# Patient Record
Sex: FEMALE | Race: WHITE | NOT HISPANIC OR LATINO | Employment: OTHER | URBAN - METROPOLITAN AREA
[De-identification: names, ages, dates, MRNs, and addresses within clinical notes are randomized per-mention and may not be internally consistent; named-entity substitution may affect disease eponyms.]

---

## 2017-01-19 ENCOUNTER — COMMUNICATION - HEALTHEAST (OUTPATIENT)
Dept: OBGYN | Facility: CLINIC | Age: 32
End: 2017-01-19

## 2017-09-10 ENCOUNTER — HEALTH MAINTENANCE LETTER (OUTPATIENT)
Age: 32
End: 2017-09-10

## 2018-09-07 ENCOUNTER — RECORDS - HEALTHEAST (OUTPATIENT)
Dept: ADMINISTRATIVE | Facility: OTHER | Age: 33
End: 2018-09-07

## 2018-11-08 ENCOUNTER — COMMUNICATION - HEALTHEAST (OUTPATIENT)
Dept: MIDWIFE SERVICES | Facility: CLINIC | Age: 33
End: 2018-11-08

## 2018-11-13 ENCOUNTER — RECORDS - HEALTHEAST (OUTPATIENT)
Dept: ADMINISTRATIVE | Facility: OTHER | Age: 33
End: 2018-11-13

## 2021-06-02 VITALS — WEIGHT: 192 LBS | BODY MASS INDEX: 31.95 KG/M2 | HEIGHT: 65 IN | BODY MASS INDEX: 31.95 KG/M2

## 2021-07-31 ENCOUNTER — HEALTH MAINTENANCE LETTER (OUTPATIENT)
Age: 36
End: 2021-07-31

## 2021-09-25 ENCOUNTER — HEALTH MAINTENANCE LETTER (OUTPATIENT)
Age: 36
End: 2021-09-25

## 2022-08-27 ENCOUNTER — HEALTH MAINTENANCE LETTER (OUTPATIENT)
Age: 37
End: 2022-08-27

## 2023-02-19 LAB — GROUP B STREPTOCOCCUS (EXTERNAL): NEGATIVE

## 2023-04-13 ENCOUNTER — TRANSFERRED RECORDS (OUTPATIENT)
Dept: HEALTH INFORMATION MANAGEMENT | Facility: CLINIC | Age: 38
End: 2023-04-13
Payer: COMMERCIAL

## 2023-04-22 ENCOUNTER — HEALTH MAINTENANCE LETTER (OUTPATIENT)
Age: 38
End: 2023-04-22

## 2023-05-11 ENCOUNTER — ANESTHESIA EVENT (OUTPATIENT)
Dept: OBGYN | Facility: HOSPITAL | Age: 38
End: 2023-05-11
Payer: COMMERCIAL

## 2023-05-11 ENCOUNTER — HOSPITAL ENCOUNTER (INPATIENT)
Facility: HOSPITAL | Age: 38
LOS: 1 days | Discharge: HOME | End: 2023-05-12
Attending: FAMILY MEDICINE | Admitting: FAMILY MEDICINE
Payer: COMMERCIAL

## 2023-05-11 ENCOUNTER — ANESTHESIA (OUTPATIENT)
Dept: OBGYN | Facility: HOSPITAL | Age: 38
End: 2023-05-11
Payer: COMMERCIAL

## 2023-05-11 ENCOUNTER — TRANSFERRED RECORDS (OUTPATIENT)
Dept: HEALTH INFORMATION MANAGEMENT | Facility: CLINIC | Age: 38
End: 2023-05-11

## 2023-05-11 DIAGNOSIS — Z37.9 NORMAL LABOR: ICD-10-CM

## 2023-05-11 DIAGNOSIS — I10 PRIMARY HYPERTENSION: Primary | ICD-10-CM

## 2023-05-11 PROBLEM — O09.90 HIGH-RISK PREGNANCY, UNSPECIFIED TRIMESTER: Status: ACTIVE | Noted: 2023-05-11

## 2023-05-11 LAB
ABO/RH(D): NORMAL
ANTIBODY SCREEN: NEGATIVE
HGB BLD-MCNC: 11.1 G/DL (ref 11.7–15.7)
HOLD SPECIMEN: NORMAL
SPECIMEN EXPIRATION DATE: NORMAL

## 2023-05-11 PROCEDURE — 3E0R3BZ INTRODUCTION OF ANESTHETIC AGENT INTO SPINAL CANAL, PERCUTANEOUS APPROACH: ICD-10-PCS | Performed by: ANESTHESIOLOGY

## 2023-05-11 PROCEDURE — 85018 HEMOGLOBIN: CPT | Performed by: FAMILY MEDICINE

## 2023-05-11 PROCEDURE — 86780 TREPONEMA PALLIDUM: CPT | Performed by: FAMILY MEDICINE

## 2023-05-11 PROCEDURE — 36415 COLL VENOUS BLD VENIPUNCTURE: CPT | Performed by: FAMILY MEDICINE

## 2023-05-11 PROCEDURE — 250N000011 HC RX IP 250 OP 636

## 2023-05-11 PROCEDURE — 258N000003 HC RX IP 258 OP 636: Performed by: ANESTHESIOLOGY

## 2023-05-11 PROCEDURE — 250N000011 HC RX IP 250 OP 636: Performed by: ANESTHESIOLOGY

## 2023-05-11 PROCEDURE — 370N000003 HC ANESTHESIA WARD SERVICE: Performed by: ANESTHESIOLOGY

## 2023-05-11 PROCEDURE — 00HU33Z INSERTION OF INFUSION DEVICE INTO SPINAL CANAL, PERCUTANEOUS APPROACH: ICD-10-PCS | Performed by: ANESTHESIOLOGY

## 2023-05-11 PROCEDURE — 722N000001 HC LABOR CARE VAGINAL DELIVERY SINGLE

## 2023-05-11 PROCEDURE — 250N000013 HC RX MED GY IP 250 OP 250 PS 637: Performed by: FAMILY MEDICINE

## 2023-05-11 PROCEDURE — 0KQM0ZZ REPAIR PERINEUM MUSCLE, OPEN APPROACH: ICD-10-PCS | Performed by: FAMILY MEDICINE

## 2023-05-11 PROCEDURE — 120N000001 HC R&B MED SURG/OB

## 2023-05-11 PROCEDURE — 250N000011 HC RX IP 250 OP 636: Performed by: FAMILY MEDICINE

## 2023-05-11 PROCEDURE — 258N000003 HC RX IP 258 OP 636: Performed by: FAMILY MEDICINE

## 2023-05-11 PROCEDURE — 999N000127 HC STATISTIC PERIPHERAL IV START W US GUIDANCE

## 2023-05-11 PROCEDURE — 250N000009 HC RX 250: Performed by: FAMILY MEDICINE

## 2023-05-11 PROCEDURE — 86850 RBC ANTIBODY SCREEN: CPT | Performed by: FAMILY MEDICINE

## 2023-05-11 RX ORDER — METHYLERGONOVINE MALEATE 0.2 MG/ML
200 INJECTION INTRAVENOUS
Status: DISCONTINUED | OUTPATIENT
Start: 2023-05-11 | End: 2023-05-11 | Stop reason: HOSPADM

## 2023-05-11 RX ORDER — HYDROCORTISONE 25 MG/G
CREAM TOPICAL 3 TIMES DAILY PRN
Status: DISCONTINUED | OUTPATIENT
Start: 2023-05-11 | End: 2023-05-13 | Stop reason: HOSPADM

## 2023-05-11 RX ORDER — ACETAMINOPHEN 325 MG/1
650 TABLET ORAL EVERY 4 HOURS PRN
Status: DISCONTINUED | OUTPATIENT
Start: 2023-05-11 | End: 2023-05-13 | Stop reason: HOSPADM

## 2023-05-11 RX ORDER — FENTANYL CITRATE-0.9 % NACL/PF 10 MCG/ML
PLASTIC BAG, INJECTION (ML) INTRAVENOUS
Status: DISCONTINUED
Start: 2023-05-11 | End: 2023-05-12 | Stop reason: WASHOUT

## 2023-05-11 RX ORDER — CITRIC ACID/SODIUM CITRATE 334-500MG
30 SOLUTION, ORAL ORAL
Status: DISCONTINUED | OUTPATIENT
Start: 2023-05-11 | End: 2023-05-11 | Stop reason: HOSPADM

## 2023-05-11 RX ORDER — KETOROLAC TROMETHAMINE 30 MG/ML
30 INJECTION, SOLUTION INTRAMUSCULAR; INTRAVENOUS
Status: DISCONTINUED | OUTPATIENT
Start: 2023-05-11 | End: 2023-05-13 | Stop reason: HOSPADM

## 2023-05-11 RX ORDER — PROCHLORPERAZINE 25 MG
25 SUPPOSITORY, RECTAL RECTAL EVERY 12 HOURS PRN
Status: DISCONTINUED | OUTPATIENT
Start: 2023-05-11 | End: 2023-05-11 | Stop reason: HOSPADM

## 2023-05-11 RX ORDER — IBUPROFEN 800 MG/1
800 TABLET, FILM COATED ORAL
Status: DISCONTINUED | OUTPATIENT
Start: 2023-05-11 | End: 2023-05-13 | Stop reason: HOSPADM

## 2023-05-11 RX ORDER — NALOXONE HYDROCHLORIDE 0.4 MG/ML
0.4 INJECTION, SOLUTION INTRAMUSCULAR; INTRAVENOUS; SUBCUTANEOUS
Status: DISCONTINUED | OUTPATIENT
Start: 2023-05-11 | End: 2023-05-11 | Stop reason: HOSPADM

## 2023-05-11 RX ORDER — PROCHLORPERAZINE MALEATE 10 MG
10 TABLET ORAL EVERY 6 HOURS PRN
Status: DISCONTINUED | OUTPATIENT
Start: 2023-05-11 | End: 2023-05-11 | Stop reason: HOSPADM

## 2023-05-11 RX ORDER — PRENATAL VIT/IRON FUM/FOLIC AC 27MG-0.8MG
1 TABLET ORAL DAILY
COMMUNITY

## 2023-05-11 RX ORDER — MISOPROSTOL 200 UG/1
400 TABLET ORAL
Status: DISCONTINUED | OUTPATIENT
Start: 2023-05-11 | End: 2023-05-13 | Stop reason: HOSPADM

## 2023-05-11 RX ORDER — LIDOCAINE HYDROCHLORIDE AND EPINEPHRINE 15; 5 MG/ML; UG/ML
3 INJECTION, SOLUTION EPIDURAL
Status: DISCONTINUED | OUTPATIENT
Start: 2023-05-11 | End: 2023-05-11 | Stop reason: HOSPADM

## 2023-05-11 RX ORDER — ONDANSETRON 4 MG/1
4 TABLET, ORALLY DISINTEGRATING ORAL EVERY 6 HOURS PRN
Status: DISCONTINUED | OUTPATIENT
Start: 2023-05-11 | End: 2023-05-11 | Stop reason: HOSPADM

## 2023-05-11 RX ORDER — BUPIVACAINE HYDROCHLORIDE 2.5 MG/ML
INJECTION, SOLUTION EPIDURAL; INFILTRATION; INTRACAUDAL
Status: COMPLETED | OUTPATIENT
Start: 2023-05-11 | End: 2023-05-11

## 2023-05-11 RX ORDER — OXYTOCIN 10 [USP'U]/ML
10 INJECTION, SOLUTION INTRAMUSCULAR; INTRAVENOUS
Status: DISCONTINUED | OUTPATIENT
Start: 2023-05-11 | End: 2023-05-13 | Stop reason: HOSPADM

## 2023-05-11 RX ORDER — FENTANYL/ROPIVACAINE/NS/PF 2MCG/ML-.1
PLASTIC BAG, INJECTION (ML) EPIDURAL
Status: COMPLETED
Start: 2023-05-11 | End: 2023-05-11

## 2023-05-11 RX ORDER — NALOXONE HYDROCHLORIDE 0.4 MG/ML
0.2 INJECTION, SOLUTION INTRAMUSCULAR; INTRAVENOUS; SUBCUTANEOUS
Status: DISCONTINUED | OUTPATIENT
Start: 2023-05-11 | End: 2023-05-11 | Stop reason: HOSPADM

## 2023-05-11 RX ORDER — LEVOTHYROXINE SODIUM 50 UG/1
50 TABLET ORAL DAILY
COMMUNITY

## 2023-05-11 RX ORDER — MISOPROSTOL 200 UG/1
800 TABLET ORAL
Status: DISCONTINUED | OUTPATIENT
Start: 2023-05-11 | End: 2023-05-11 | Stop reason: HOSPADM

## 2023-05-11 RX ORDER — FENTANYL CITRATE-0.9 % NACL/PF 10 MCG/ML
100 PLASTIC BAG, INJECTION (ML) INTRAVENOUS EVERY 5 MIN PRN
Status: DISCONTINUED | OUTPATIENT
Start: 2023-05-11 | End: 2023-05-11 | Stop reason: HOSPADM

## 2023-05-11 RX ORDER — NALBUPHINE HYDROCHLORIDE 20 MG/ML
2.5-5 INJECTION, SOLUTION INTRAMUSCULAR; INTRAVENOUS; SUBCUTANEOUS EVERY 6 HOURS PRN
Status: DISCONTINUED | OUTPATIENT
Start: 2023-05-11 | End: 2023-05-13 | Stop reason: HOSPADM

## 2023-05-11 RX ORDER — OXYTOCIN/0.9 % SODIUM CHLORIDE 30/500 ML
340 PLASTIC BAG, INJECTION (ML) INTRAVENOUS CONTINUOUS PRN
Status: DISCONTINUED | OUTPATIENT
Start: 2023-05-11 | End: 2023-05-13 | Stop reason: HOSPADM

## 2023-05-11 RX ORDER — FERROUS GLUCONATE 324(38)MG
324 TABLET ORAL
COMMUNITY
End: 2024-08-22

## 2023-05-11 RX ORDER — METHYLERGONOVINE MALEATE 0.2 MG/ML
200 INJECTION INTRAVENOUS
Status: DISCONTINUED | OUTPATIENT
Start: 2023-05-11 | End: 2023-05-13 | Stop reason: HOSPADM

## 2023-05-11 RX ORDER — METOCLOPRAMIDE HYDROCHLORIDE 5 MG/ML
10 INJECTION INTRAMUSCULAR; INTRAVENOUS EVERY 6 HOURS PRN
Status: DISCONTINUED | OUTPATIENT
Start: 2023-05-11 | End: 2023-05-11 | Stop reason: HOSPADM

## 2023-05-11 RX ORDER — MISOPROSTOL 200 UG/1
800 TABLET ORAL
Status: DISCONTINUED | OUTPATIENT
Start: 2023-05-11 | End: 2023-05-13 | Stop reason: HOSPADM

## 2023-05-11 RX ORDER — IBUPROFEN 800 MG/1
800 TABLET, FILM COATED ORAL EVERY 6 HOURS PRN
Status: DISCONTINUED | OUTPATIENT
Start: 2023-05-11 | End: 2023-05-13 | Stop reason: HOSPADM

## 2023-05-11 RX ORDER — CARBOPROST TROMETHAMINE 250 UG/ML
250 INJECTION, SOLUTION INTRAMUSCULAR
Status: DISCONTINUED | OUTPATIENT
Start: 2023-05-11 | End: 2023-05-11 | Stop reason: HOSPADM

## 2023-05-11 RX ORDER — OXYTOCIN/0.9 % SODIUM CHLORIDE 30/500 ML
340 PLASTIC BAG, INJECTION (ML) INTRAVENOUS CONTINUOUS PRN
Status: COMPLETED | OUTPATIENT
Start: 2023-05-11 | End: 2023-05-11

## 2023-05-11 RX ORDER — DOCUSATE SODIUM 100 MG/1
100 CAPSULE, LIQUID FILLED ORAL DAILY
Status: DISCONTINUED | OUTPATIENT
Start: 2023-05-12 | End: 2023-05-13 | Stop reason: HOSPADM

## 2023-05-11 RX ORDER — OXYTOCIN/0.9 % SODIUM CHLORIDE 30/500 ML
100-340 PLASTIC BAG, INJECTION (ML) INTRAVENOUS CONTINUOUS PRN
Status: DISCONTINUED | OUTPATIENT
Start: 2023-05-11 | End: 2023-05-13 | Stop reason: HOSPADM

## 2023-05-11 RX ORDER — ONDANSETRON 2 MG/ML
4 INJECTION INTRAMUSCULAR; INTRAVENOUS EVERY 6 HOURS PRN
Status: DISCONTINUED | OUTPATIENT
Start: 2023-05-11 | End: 2023-05-11 | Stop reason: HOSPADM

## 2023-05-11 RX ORDER — FENTANYL/ROPIVACAINE/NS/PF 2MCG/ML-.1
PLASTIC BAG, INJECTION (ML) EPIDURAL
Status: DISCONTINUED | OUTPATIENT
Start: 2023-05-11 | End: 2023-05-11 | Stop reason: HOSPADM

## 2023-05-11 RX ORDER — LABETALOL 100 MG/1
100 TABLET, FILM COATED ORAL 2 TIMES DAILY
COMMUNITY

## 2023-05-11 RX ORDER — METOCLOPRAMIDE 10 MG/1
10 TABLET ORAL EVERY 6 HOURS PRN
Status: DISCONTINUED | OUTPATIENT
Start: 2023-05-11 | End: 2023-05-11 | Stop reason: HOSPADM

## 2023-05-11 RX ORDER — MISOPROSTOL 200 UG/1
400 TABLET ORAL
Status: DISCONTINUED | OUTPATIENT
Start: 2023-05-11 | End: 2023-05-11 | Stop reason: HOSPADM

## 2023-05-11 RX ORDER — BUPIVACAINE HYDROCHLORIDE 2.5 MG/ML
7 INJECTION, SOLUTION EPIDURAL; INFILTRATION; INTRACAUDAL ONCE
Status: DISCONTINUED | OUTPATIENT
Start: 2023-05-11 | End: 2023-05-11 | Stop reason: HOSPADM

## 2023-05-11 RX ORDER — BISACODYL 10 MG
10 SUPPOSITORY, RECTAL RECTAL DAILY PRN
Status: DISCONTINUED | OUTPATIENT
Start: 2023-05-11 | End: 2023-05-13 | Stop reason: HOSPADM

## 2023-05-11 RX ORDER — CARBOPROST TROMETHAMINE 250 UG/ML
250 INJECTION, SOLUTION INTRAMUSCULAR
Status: DISCONTINUED | OUTPATIENT
Start: 2023-05-11 | End: 2023-05-13 | Stop reason: HOSPADM

## 2023-05-11 RX ORDER — OXYTOCIN 10 [USP'U]/ML
10 INJECTION, SOLUTION INTRAMUSCULAR; INTRAVENOUS
Status: DISCONTINUED | OUTPATIENT
Start: 2023-05-11 | End: 2023-05-11 | Stop reason: HOSPADM

## 2023-05-11 RX ORDER — MODIFIED LANOLIN
OINTMENT (GRAM) TOPICAL
Status: DISCONTINUED | OUTPATIENT
Start: 2023-05-11 | End: 2023-05-13 | Stop reason: HOSPADM

## 2023-05-11 RX ADMIN — BUPIVACAINE HYDROCHLORIDE 4 ML: 2.5 INJECTION, SOLUTION EPIDURAL; INFILTRATION; INTRACAUDAL at 15:13

## 2023-05-11 RX ADMIN — Medication: at 15:09

## 2023-05-11 RX ADMIN — Medication: at 23:16

## 2023-05-11 RX ADMIN — Medication 340 ML/HR: at 21:19

## 2023-05-11 RX ADMIN — LIDOCAINE HYDROCHLORIDE 10 ML: 10 INJECTION, SOLUTION INFILTRATION; PERINEURAL at 22:24

## 2023-05-11 RX ADMIN — BENZOCAINE AND LEVOMENTHOL: 200; 5 SPRAY TOPICAL at 23:16

## 2023-05-11 RX ADMIN — KETOROLAC TROMETHAMINE 30 MG: 30 INJECTION, SOLUTION INTRAMUSCULAR; INTRAVENOUS at 22:03

## 2023-05-11 RX ADMIN — BUPIVACAINE HYDROCHLORIDE 3 ML: 2.5 INJECTION, SOLUTION EPIDURAL; INFILTRATION; INTRACAUDAL at 15:10

## 2023-05-11 RX ADMIN — SODIUM CHLORIDE, POTASSIUM CHLORIDE, SODIUM LACTATE AND CALCIUM CHLORIDE 1000 ML: 600; 310; 30; 20 INJECTION, SOLUTION INTRAVENOUS at 14:29

## 2023-05-11 RX ADMIN — SODIUM CHLORIDE, POTASSIUM CHLORIDE, SODIUM LACTATE AND CALCIUM CHLORIDE 1000 ML: 600; 310; 30; 20 INJECTION, SOLUTION INTRAVENOUS at 13:55

## 2023-05-11 RX ADMIN — WITCH HAZEL: 500 SOLUTION RECTAL; TOPICAL at 23:15

## 2023-05-11 ASSESSMENT — ACTIVITIES OF DAILY LIVING (ADL)
WALKING_OR_CLIMBING_STAIRS_DIFFICULTY: NO
DOING_ERRANDS_INDEPENDENTLY_DIFFICULTY: NO
WEAR_GLASSES_OR_BLIND: NO
ADLS_ACUITY_SCORE: 18
ADLS_ACUITY_SCORE: 18
DIFFICULTY_EATING/SWALLOWING: NO
ADLS_ACUITY_SCORE: 18
FALL_HISTORY_WITHIN_LAST_SIX_MONTHS: NO
ADLS_ACUITY_SCORE: 18
CHANGE_IN_FUNCTIONAL_STATUS_SINCE_ONSET_OF_CURRENT_ILLNESS/INJURY: NO
DRESSING/BATHING_DIFFICULTY: NO
CONCENTRATING,_REMEMBERING_OR_MAKING_DECISIONS_DIFFICULTY: NO
TOILETING_ISSUES: NO
ADLS_ACUITY_SCORE: 18
ADLS_ACUITY_SCORE: 18

## 2023-05-11 NOTE — PROGRESS NOTES
Jhonatan presents from home with her  Sheldon. She is 40 weeks today and her water broke at 1100. She is leaking large amounts of clear fluid. Contractions are rapidly becoming reguoar and painful. Dr. Higgins notified and orders received..   IV started along with IV fluid bolus. Jhonatan would like an epidural when able.,

## 2023-05-11 NOTE — PROGRESS NOTES
SVE no change. Marie inserted with large amount of clear yellow urine. Dr. Mleo updated on Jhonatan' labor status including SVE, ctx pattern and FHR pattern

## 2023-05-11 NOTE — PROGRESS NOTES
Jhonatan remains uncomfortable after epidural. She has no detectable anesthesia level on right side. Left side is at T10. Dr. Pena notified.

## 2023-05-11 NOTE — ANESTHESIA PROCEDURE NOTES
Epidural catheter Procedure Note    Pre-Procedure   Staff -        Anesthesiologist:  Yen Bingham MD       Performed By: anesthesiologist       Location: OB       Procedure Start/Stop Times: 5/11/2023 2:42 PM and 5/11/2023 3:15 PM       Pre-Anesthestic Checklist: patient identified, IV checked, risks and benefits discussed, informed consent, monitors and equipment checked and pre-op evaluation  Timeout:       Correct Patient: Yes        Correct Procedure: Yes        Correct Site: Yes        Correct Position: Yes   Procedure Documentation  Procedure: epidural catheter       Diagnosis: Labor Pain       Patient Position: sitting       Patient Prep/Sterile Barriers: sterile gloves, mask, patient draped       Skin prep: Chloraprep       Local skin infiltrated with mL of 1% lidocaine.        Insertion Site: L3-4. (midline approach).       Technique: LORT saline        Needle Type: myinfoQ       Needle Gauge: 17.        Needle Length (Inches): 3.5        Catheter: 18 G.          Catheter threaded easily.         5 cm epidural space.           # of attempts: 3 and  # of redirects:  3    Assessment/Narrative         Paresthesias: No.       Test dose of 3 (Repeat 2cc TD at 3:07 PM neg) mL at 15:04 CDT.         Test dose negative, 3 minutes after injection, for signs of intravascular, subdural, or intrathecal injection.       Insertion/Infusion Method: LORT saline       Aspiration negative for Heme or CSF via Epidural Catheter.    Medication(s) Administered   0.25% Bupivacaine PF (Epidural) - EPIDURAL, Back   3 mL - 5/11/2023 3:10:00 PM   4 mL - 5/11/2023 3:13:00 PM  Medication Administration Time: 5/11/2023 2:42 PM     Comments:  Risks of epidural reviewed including hypotension, spinal H/A that can last 10 days w/o treatment, back pain, failed block or partial block, nerve injury, infection. Pt denied questions.     Difficult placement of epidural. Attempts made at L2-3, L3-4 and L4-5 without. Eventually placed at  "L1-2. Catheter inserted 5 cm. Negative TD. Epidural bolus given with decreased sensation on the left. Pt reports difficult LE placement with prior pregnancy.      FOR [facility] ([facility]) ONLY:   Pain Team Contact information: please page the Pain Team Via [employer]. Search \"Pain\". During daytime hours, please page the attending first. At night please page the resident first.      "

## 2023-05-11 NOTE — ANESTHESIA PREPROCEDURE EVALUATION
Anesthesia Pre-Procedure Evaluation    Patient: Chandni Landin   MRN: [id number] : 1985        Procedure : * No procedures listed *          Past Medical History:   Diagnosis Date     Acne      Contraceptive management      Depressive disorder, not elsewhere classified     TX'd with Prozac, resolved     Hyperhydrosis disorder     Left axilla & palm,  Robinul-Forte 2mg     Premenstrual syndrome (PMS)       Past Surgical History:   Procedure Laterality Date     DILATION AND CURETTAGE N/A 12/15/2014    Procedure: SUCTION CURETTAGE ;  Surgeon: Maulik Greenwood MD;  Location: [facility];  Service:      TONSILLECTOMY       TONSILLECTOMY       WISDOM TOOTH EXTRACTION        Allergies   Allergen Reactions     Milk Protein GI Disturbance      Social History     Tobacco Use     Smoking status: Never     Smokeless tobacco: Never   Vaping Use     Vaping status: Not on file   Substance Use Topics     Alcohol use: No     Comment: 2 drinks per mth      Wt Readings from Last 1 Encounters:   23 93.6 kg (206 lb 4.8 oz)        Anesthesia Evaluation   Pt has had prior anesthetic. Type: MAC, General and OB Labor Epidural.    No history of anesthetic complications       ROS/MED HX  ENT/Pulmonary:  - neg pulmonary ROS     Neurologic:  - neg neurologic ROS     Cardiovascular:    (-) PIH   METS/Exercise Tolerance:     Hematologic:  - neg hematologic  ROS     Musculoskeletal:       GI/Hepatic:     (+) GERD,     Renal/Genitourinary:       Endo:     (+) thyroid problem, hypothyroidism, Obesity,     Psychiatric/Substance Use:     (+) psychiatric history depression     Infectious Disease:       Malignancy:       Other:            Physical Exam    Airway        Mallampati: II   TM distance: > 3 FB   Neck ROM: full   Mouth opening: > 3 cm    Respiratory Devices and Support         Dental  no notable dental history         Cardiovascular   cardiovascular exam normal          Pulmonary   pulmonary  exam normal                OUTSIDE LABS:  CBC:   Lab Results   Component Value Date    HGB 11.1 (L) 05/11/2023     BMP:   Lab Results   Component Value Date    GLC 81 07/29/2009    GLC 77 12/12/2007     COAGS: No results found for: PTT, INR, FIBR  POC: No results found for: BGM, HCG, HCGS  HEPATIC: No results found for: ALBUMIN, PROTTOTAL, ALT, AST, GGT, ALKPHOS, BILITOTAL, BILIDIRECT, SULY  OTHER: No results found for: PH, LACT, A1C, IGNACIO, PHOS, MAG, LIPASE, AMYLASE, TSH, T4, T3, CRP, SED    Anesthesia Plan    ASA Status:  2      Anesthesia Type: Epidural.              Consents    Anesthesia Plan(s) and associated risks, benefits, and realistic alternatives discussed. Questions answered and patient/representative(s) expressed understanding.    - Discussed:     - Discussed with:  Patient, Spouse         Postoperative Care            Comments:           neg OB ROS.       Yen Bingham MD   Patient

## 2023-05-12 VITALS
OXYGEN SATURATION: 99 % | TEMPERATURE: 98.3 F | HEART RATE: 78 BPM | BODY MASS INDEX: 31.75 KG/M2 | DIASTOLIC BLOOD PRESSURE: 90 MMHG | RESPIRATION RATE: 16 BRPM | SYSTOLIC BLOOD PRESSURE: 139 MMHG | WEIGHT: 190.6 LBS | HEIGHT: 65 IN

## 2023-05-12 PROBLEM — Z37.9 NORMAL LABOR: Status: RESOLVED | Noted: 2023-05-11 | Resolved: 2023-05-12

## 2023-05-12 PROBLEM — O10.919 CHRONIC HYPERTENSION AFFECTING PREGNANCY: Status: ACTIVE | Noted: 2023-05-12

## 2023-05-12 PROBLEM — O09.90 HIGH-RISK PREGNANCY, UNSPECIFIED TRIMESTER: Status: RESOLVED | Noted: 2023-05-11 | Resolved: 2023-05-12

## 2023-05-12 LAB — T PALLIDUM AB SER QL: NONREACTIVE

## 2023-05-12 PROCEDURE — 250N000013 HC RX MED GY IP 250 OP 250 PS 637: Performed by: FAMILY MEDICINE

## 2023-05-12 RX ORDER — ACETAMINOPHEN 325 MG/1
650 TABLET ORAL EVERY 4 HOURS PRN
COMMUNITY
Start: 2023-05-12 | End: 2024-08-12

## 2023-05-12 RX ORDER — IBUPROFEN 200 MG
800 TABLET ORAL EVERY 6 HOURS PRN
COMMUNITY
Start: 2023-05-12 | End: 2024-08-22

## 2023-05-12 RX ORDER — LABETALOL 100 MG/1
100 TABLET, FILM COATED ORAL 2 TIMES DAILY
Status: DISCONTINUED | OUTPATIENT
Start: 2023-05-12 | End: 2023-05-13 | Stop reason: HOSPADM

## 2023-05-12 RX ORDER — LEVOTHYROXINE SODIUM 25 UG/1
50 TABLET ORAL DAILY
Status: DISCONTINUED | OUTPATIENT
Start: 2023-05-12 | End: 2023-05-13 | Stop reason: HOSPADM

## 2023-05-12 RX ADMIN — IBUPROFEN 800 MG: 800 TABLET ORAL at 22:47

## 2023-05-12 RX ADMIN — IBUPROFEN 800 MG: 800 TABLET ORAL at 16:55

## 2023-05-12 RX ADMIN — DOCUSATE SODIUM 100 MG: 100 CAPSULE, LIQUID FILLED ORAL at 09:31

## 2023-05-12 RX ADMIN — LEVOTHYROXINE SODIUM 50 MCG: 0.03 TABLET ORAL at 09:30

## 2023-05-12 RX ADMIN — LABETALOL HYDROCHLORIDE 100 MG: 100 TABLET, FILM COATED ORAL at 09:31

## 2023-05-12 RX ADMIN — LABETALOL HYDROCHLORIDE 100 MG: 100 TABLET, FILM COATED ORAL at 21:16

## 2023-05-12 RX ADMIN — IBUPROFEN 800 MG: 800 TABLET ORAL at 10:23

## 2023-05-12 ASSESSMENT — ACTIVITIES OF DAILY LIVING (ADL)
ADLS_ACUITY_SCORE: 18

## 2023-05-12 NOTE — ANESTHESIA PROCEDURE NOTES
"Epidural catheter Procedure Note    Pre-Procedure   Staff -        Anesthesiologist:  Brodie Pena MD       Performed By: anesthesiologist       Location: OB       Procedure Start/Stop Times: 5/11/2023 6:15 PM and 5/11/2023 6:30 PM       Pre-Anesthestic Checklist: patient identified, IV checked, risks and benefits discussed, informed consent, monitors and equipment checked, pre-op evaluation, at physician/surgeon's request and post-op pain management  Timeout:       Correct Patient: Yes        Correct Procedure: Yes        Correct Site: Yes        Correct Position: Yes   Procedure Documentation  Procedure: epidural catheter       Patient Position: sitting       Patient Prep/Sterile Barriers: sterile gloves, mask, patient draped       Skin prep: Chloraprep       Local skin infiltrated with mL of 1% lidocaine.        Insertion Site: T12-L1. (midline approach).       Technique: LORT air        ROSENDO at 5 cm.       Needle Type: [email or web address]       Needle Gauge: 18.        Needle Length (Inches): 3.5        Catheter: 20 G.          Catheter threaded easily.         7 cm epidural space.         Threaded 12 cm at skin.         # of attempts: 1 and  # of redirects:  0    Assessment/Narrative         Paresthesias: No.       Test dose of 3 mL lidocaine 1.5% w/ 1:200,000 epinephrine at.         Test dose negative, 3 minutes after injection, for signs of intravascular, subdural, or intrathecal injection.       Insertion/Infusion Method: LORT air       Aspiration negative for Heme or CSF via Epidural Catheter.    Medication(s) Administered   Medication Administration Time: 5/11/2023 6:15 PM      FOR [facility] ([facility]) ONLY:   Pain Team Contact information: please page the Pain Team Via [employer] Search \"Pain\". During daytime hours, please page the attending first. At night please page the resident first.      "

## 2023-05-12 NOTE — LACTATION NOTE
This note was copied from a baby's chart.  This writer met with Chandni to assist with latching infant.  She has a small amount of interstitial fluid at the base of her nipples.  Educated on reverse pressure softening and hand expression.  Chandni to use these techniques prior to latching infant.  She reports breastfeeding other children but states she always struggled in the beginning when they were small.  Education given on the importance of optimal positioning for deep, comfortable latch and effective milk transfer, the use of breast compression to assist with milk transfer, listening for swallows, the importance of feeding baby on early hunger cues, and breastfeeding 8-12 times in 24 hours for optimal infant nutrition and hydration as well as for building an optimal milk supply.  She was encouraged to follow up at the [facility] after discharge for any breastfeeding questions or concerns.    After education and Chandni hand expressed to soften areolar tissue, this writer demonstrated proper latch techniques.  Infant able to latch onto the breast and rhythmically suck with occasional swallows heard.  Swallows increased when breast compression was used.  Chandni's nipple came out of infant's mouth with a compression stripe down the center of the nipple.  This writer allowed Chandni to practice latching infant.  She was able to latch infant independently, after a few attempts.  She denied nipple pain.  Again, her nipple appeared slightly compressed after the feeding.  Infant able to latch onto both side, however, a deeper latch is needed, as her nipple tissue appears slightly compressed once the nipple comes out of infant's mouth.  Lisetmichelle instructed to hand express after breastfeeding and given infant her expressed colostrum, as often as able, until infant breastfeeding well with no nipple compression.  She verbalizes understanding of all education given.  She denies any further questions.

## 2023-05-12 NOTE — ANESTHESIA POSTPROCEDURE EVALUATION
Patient: Chandni Landin    Procedure: * No procedures listed *       Anesthesia Type:  Epidural    Note:  Disposition: Inpatient   Postop Pain Control: Uneventful            Sign Out: Well controlled pain   PONV: No   Neuro/Psych: Uneventful            Sign Out: Acceptable/Baseline neuro status   Airway/Respiratory: Uneventful            Sign Out: Acceptable/Baseline resp. status   CV/Hemodynamics: Uneventful            Sign Out: Acceptable CV status; No obvious hypovolemia; No obvious fluid overload   Other NRE: NONE   DID A NON-ROUTINE EVENT OCCUR? No           Last vitals:  Vitals:    05/11/23 2207 05/11/23 2222 05/11/23 2237   BP: 125/61 135/72 132/69   Pulse:      Resp:      Temp:      SpO2:          Electronically Signed By: Brodie Pena MD  May 11, 2023  10:50 PM

## 2023-05-12 NOTE — H&P
[facility] Labor and Delivery History and Physical    Jahaira Owens MRN# [id number]   Age: 38 year old YOB: 1985     Date of Admission:  2023    Primary care provider: Nadira Melo           Chief Complaint:   Jahaira Owens is a 38 year old female who is 40w0d pregnant and being admitted for active labor management.          Pregnancy history:     OBSTETRIC HISTORY:    OB History    Para Term  AB Living   5 4 3 1 0 4   SAB IAB Ectopic Multiple Live Births   0 0 0 0 4      # Outcome Date GA Lbr Salas/2nd Weight Sex Delivery Anes PTL Lv   5 Current            4 Term 10/03/18 41w3d  3.92 kg (8 lb 10.3 oz) M Vag-Spont Local N ALLI      Complications: Fetal Intolerance      Name: GRACIELAMALE-JAHAIRA      Apgar1: 8  Apgar5: 9   3 Term 10/19/16 40w6d 06:18 / 00:33 4.139 kg (9 lb 2 oz) F Vag-Spont None N ALLI      Name: GRACIELAFEMALE-JAHAIRA      Apgar1: 8  Apgar5: 9   2  10/11/14 36w3d / 00:54 3.062 kg (6 lb 12 oz) F Vag-Spont EPI Y ALLI      Name: CAROLYNN OWENS-JAHAIRA      Apgar1: 9  Apgar5: 9   1 Term 13    F Vag-Spont  N ALLI       EDC: Estimated Date of Delivery: 23    Prenatal Labs:   Lab Results   Component Value Date    AS Negative 2023    HGB 11.1 (L) 2023       GBS Status:   Lab Results   Component Value Date    GBS Negative 2023       Active Problem List  Patient Active Problem List   Diagnosis     CARDIOVASCULAR SCREENING; LDL GOAL LESS THAN 160     Normal labor     High-risk pregnancy, unspecified trimester     HTN (hypertension)       Medication Prior to Admission  Medications Prior to Admission   Medication Sig Dispense Refill Last Dose     ferrous gluconate (FERGON) 324 (38 Fe) MG tablet Take 324 mg by mouth daily (with breakfast)        labetalol (NORMODYNE) 100 MG tablet Take 100 mg by mouth 2 times daily        levothyroxine (SYNTHROID/LEVOTHROID) 50 MCG tablet Take 50 mcg by mouth daily        Prenatal  Vit-Fe Fumarate-FA (PRENATAL MULTIVITAMIN W/IRON) 27-0.8 MG tablet Take 1 tablet by mouth daily        CALCIUM 500 +D 500-400 MG-UNIT PO TABS 1 TABLET DAILY 30 Tab 0    .        Maternal Past Medical History:     Past Medical History:   Diagnosis Date     Acne      Contraceptive management      Depressive disorder, not elsewhere classified 2003    TX'd with Prozac, resolved     Hyperhydrosis disorder     Left axilla & palm,  Robinul-Forte 2mg     Premenstrual syndrome (PMS)                        Family History:   This patient has no significant family history            Social History:   This patient has no significant social history         Review of Systems:   Neg except labor          Physical Exam:   Vitals were reviewed  Constitutional:   awake, alert, cooperative, no apparent distress, and appears stated age     Abdomen:   Gravid NT      Cervix:   Membranes: SROM   Dilation: 3   Effacement: 60%   Station:-2    Presentation:Vertex  Fetal Heart Rate Tracing: reactive and reassuring  Tocometer: external monitor                       Assessment:   Chandni Landin is a 40w0d pregnant female admitted with active labor management.    Chronic HTN well controlled on labetolol      Plan:   Admit - see IP orders      CLAUDIA DAMON MD  5/11/2023

## 2023-05-12 NOTE — DISCHARGE SUMMARY
[facility]    Discharge Summary  Obstetrics    Date of Admission:  5/11/2023  Date of Discharge:  05/13/23    Discharging Provider: Diamante Muro MD    Discharge Diagnoses   Principal Problem:    Normal delivery  Active Problems:    HTN (hypertension)    Chronic hypertension affecting pregnancy        History of Present Illness   Chandni Landin is a 38 year old female who presented with active labor.    Hospital Course   The patient's hospital course was unremarkable.  She recovered as anticipated and experienced no post-delivery complications. On discharge, her pain was well controlled. Vaginal bleeding is similar to peak menstrual flow.  Voiding without difficulty.  Ambulating well and tolerating a normal diet.  No fevers.  Breastfeeding is progressing well.  Infant is stable.  She was discharged on post-partum day #2.    Post-partum hemoglobin:   Hemoglobin   Date Value Ref Range Status   05/11/2023 11.1 (L) 11.7 - 15.7 g/dL Final       [place] Depression Scale  Thoughts of Harming Self:    Total Score:        Diamante Muro MD    Discharge Disposition   Discharged to home   Condition at discharge: Good    Primary Care Physician   CLAUDIA DAMON    Consultations This Hospital Stay   LACTATION IP CONSULT    Discharge Orders      Activity    Activity as tolerated     Reason for your hospital stay    Maternity care     Follow Up    Follow up with provider in 2 weeks and 6 weeks for post-delivery checks     Breast Pump DME    Breast Pump Documentation:   Manual/Electric Pump: To support adequate breast milk production and nutrition for infant.     I, the undersigned, certify that the above prescribed supplies are medically necessary for this patient and is both reasonable and necessary in reference to accepted standards of medical and necessary in reference to accepted standards of medical practice in the treatment of this patient's condition and is not prescribed as a  convenience.     Home Blood Pressure Cuff Order for DME    DME Documentation:   Describe the reason for need to support medical necessity: hypertension.    I, the undersigned, certify that the above prescribed supplies are medically necessary for this patient and is both reasonable and necessary in reference to accepted standards of medical and necessary in reference to accepted standards of medical practice in the treatment of this patient's condition and is not prescribed as a convenience.     Diet    Resume previous diet     Discharge Medications   Current Discharge Medication List      START taking these medications    Details   acetaminophen (TYLENOL) 325 MG tablet Take 2 tablets (650 mg) by mouth every 4 hours as needed for mild pain or fever (greater than or equal to 38  C /100.4  F (oral) or 38.5  C/ 101.4  F (core).)    Associated Diagnoses: Normal delivery      ibuprofen (ADVIL/MOTRIN) 200 MG tablet Take 4 tablets (800 mg) by mouth every 6 hours as needed for other (cramping)    Associated Diagnoses: Normal delivery         CONTINUE these medications which have NOT CHANGED    Details   ferrous gluconate (FERGON) 324 (38 Fe) MG tablet Take 324 mg by mouth daily (with breakfast)      labetalol (NORMODYNE) 100 MG tablet Take 100 mg by mouth 2 times daily      levothyroxine (SYNTHROID/LEVOTHROID) 50 MCG tablet Take 50 mcg by mouth daily      Prenatal Vit-Fe Fumarate-FA (PRENATAL MULTIVITAMIN W/IRON) 27-0.8 MG tablet Take 1 tablet by mouth daily      CALCIUM 500 +D 500-400 MG-UNIT PO TABS 1 TABLET DAILY  Qty: 30 Tab, Refills: 0           Allergies   No Known Allergies

## 2023-05-12 NOTE — PLAN OF CARE
Postpartum assessment WNL. Pain managed with Ibuprofen. PPMA and BC need to be completed- at bedside. Breastfeeding infant, education and support provided. Lactation consult visited today, visit helpful.     Attentive to infant and independent with cares.     Fundus firm at umbilicus.   Problem: Postpartum (Vaginal Delivery)  Goal: Hemostasis  Outcome: Progressing     Voiding spontaneously without difficulty.   Problem: Postpartum (Vaginal Delivery)  Goal: Effective Urinary Elimination  Outcome: Progressing

## 2023-05-12 NOTE — PLAN OF CARE
Problem: Postpartum (Vaginal Delivery)  Goal: Hemostasis  Outcome: Progressing     Problem: Postpartum (Vaginal Delivery)  Goal: Optimal Pain Control and Function  Outcome: Progressing  Intervention: Prevent or Manage Pain  Recent Flowsheet Documentation  Taken 5/12/2023 0350 by Belkys Negron RN  Pain Management Interventions: medication offered but refused     Problem: Postpartum (Vaginal Delivery)  Goal: Effective Urinary Elimination  Outcome: Progressing     Darling vital signs are WNL. Bleeding is stable. Fundus is firm after massage and at umbilicus. Passed one large clot size when toileting. Assessed and found to be pooled clotted blood from sitting in one position for awhile. Patient was educated and had no further questions or concerns at this time. Ambulating in room independently without any symptoms. Voiding spontaneously without any difficulties. Patient has a bruised area on her lower back were epidural was placed. Patient stated that its very painful at that spot that she has to place a pillow on her back for some comfort as it hurts to sleep on that spot. Pt denies of any other symptoms. Patient denies of headaches, vision changes, or pain in upper right abdomen.

## 2023-05-12 NOTE — L&D DELIVERY NOTE
OB Vaginal Delivery Note    Channdi Landin MRN# [id number]   Age: 38 year old YOB: 1985       GA: 40w0d  GP:   Labor Complications: None   EBL:   mL  Delivery QBL: 100 mL  Delivery Type: Vaginal, Spontaneous   ROM to Delivery Time: (Delivered) Hours: 10 Minutes: 16  Ideal Weight:     1 Minute 5 Minute 10 Minute   Apgar Totals: 9   9        ALIN MITCHELL;KAITLYN MERCEDES     Delivery Details:  Chandni Landin, a 38 year old  female delivered a viable infant with apgars of 9  and 9 . Patient was fully dilated and pushing after   hours   minutes in active labor. Delivery was via vaginal, spontaneous  to a sterile field under epidural  anesthesia. Infant delivered in vertex  left  occiput  anterior  position. Anterior and posterior shoulders delivered without difficulty. The cord was clamped, cut twice and 3 vessels  were noted. Cord blood was obtained in routine fashion with the following disposition: discard .      Cord complications: none   Placenta delivered at 2023  9:21 PM . Placental disposition was Hospital disposal . Fundal massage performed and fundus found to be firm.     Episiotomy: none    Perineum, vagina, cervix were inspected, and the following lacerations were noted:   Perineal lacerations: 2nd     labial laceration: right           Any lacerations were repaired in the usual fashion using 3.0 vicryl.    Excellent hemostasis was noted. Needle count correct. Infant and patient in delivery room in good and stable condition.        Urvashi, Female-Chandni [[id number]]    Labor Event Times    Active labor onset date: 23 Onset time:  7:44 PM CDT   Dilation complete date: 23 Complete time:  8:46 PM   Start pushing date/time: 2023 2103      Labor Events     labor?: No   steroids: None     Rupture date/time: 23 1100   Rupture type: Spontaneous Rupture of Membranes  Fluid color: Clear  Fluid odor: Normal     Augmentation: None      Delivery/Placenta Date and Time    Delivery Date: 23 Delivery Time:  9:16 PM   Placenta Date/Time: 2023  9:21 PM  Oxytocin given at the time of delivery: after delivery of baby  Delivering clinician: Nadira Melo MD   Other personnel present at delivery:  Provider Role   Alin Mitchell RN Bucholz, Nicole, RN          Vaginal Counts     Initial count performed by 2 team members:  Two Team Members   MD Lai Astudillo RN       Needles Suture Needles Sponges (RETIRED) Instruments   Initial counts 1 1 5    Added to count       Relief counts       Final counts 1 1 5          Placed during labor Accounted for at the end of labor   FSE No NA   IUPC No NA   Cervidil No NA              Final count performed by 2 team members:  Two Team Members   MD Lai Astudillo RN      Final count correct?: Yes  Pre-Birth Team Brief: Complete  Post-Birth Team Debrief: Complete     Apgars    Living status: Living   1 Minute 5 Minute 10 Minute 15 Minute 20 Minute   Skin color: 1  1       Heart rate: 2  2       Reflex irritability: 2  2       Muscle tone: 2  2       Respiratory effort: 2  2       Total: 9  9       Apgars assigned by: ALIN MITCHELL RN     Cord    Vessels: 3 Vessels    Cord Complications: None   Cord Blood Disposition: Discard      Gases Sent?: No      Delayed cord clamping?: Yes      Cord Clamping Delay (seconds):  seconds      Stem cell collection?: No                     [place] Resuscitation    Methods: None     Skin to Skin and Feeding Plan    Skin to skin initiation date/time: 1841    Skin to skin with: Mother  Skin to skin end date/time:        Labor Events and Shoulder Dystocia    Fetal Tracing Prior to Delivery: Category 2  Shoulder dystocia present?: Neg     Delivery (Maternal) (Provider to Complete) ([id number])    Episiotomy: None  Perineal lacerations: 2nd Repaired?: Yes   Labial laceration: right Repaired?: Yes   Repair suture: 3-0 Vicryl  Number of repair packets:  1  Genital tract inspection done: Pos     Blood Loss  Mother: Chandni Landin #[id number]   Start of Mother's Information    Delivery Blood Loss  05/11/23 1944 - 05/11/23 2149    Delivery QBL (mL) Hospital Encounter 100 mL    Total  100 mL         End of Mother's Information  Mother: Chandni Landin #[id number]          Delivery - Provider to Complete ([id number])    Delivering clinician: Nadira Damon MD  Delivery Type (Choose the 1 that will go to the Birth History): Vaginal, Spontaneous    Other personnel:  Provider Role   Callie Rosa RN Bucholz, Nicole, RN                                Placenta    Date/Time: 5/11/2023  9:21 PM  Removal: Spontaneous  Disposition: Hospital disposal           Anesthesia    Method: Epidural  Cervical dilation at placement: 0-3                Presentation and Position    Presentation: Vertex    Position: Left Occiput Anterior                 NADIRA DAMON MD

## 2023-05-13 NOTE — PLAN OF CARE
"Patient stable and ready for discharge. Discharge instructions reviewed with patient. VSS. Patient and infant escorted to vehicle by this writer.    BP (!) 139/90 (BP Location: Right arm, Patient Position: Semi-Gerber's, Cuff Size: Adult Regular)   Pulse 78   Temp 98.3  F (36.8  C) (Oral)   Resp 16   Ht 1.651 m (5' 5\")   Wt 86.5 kg (190 lb 9.6 oz)   SpO2 99%   Breastfeeding Unknown   BMI 31.72 kg/m      Renetta Burnett RN on 5/12/2023 at 10:54 PM    "

## 2023-05-13 NOTE — DISCHARGE INSTRUCTIONS
Warning Signs after Having a Baby    Keep this paper on your fridge or somewhere else where you can see it.    Call your provider if you have any of these symptoms up to 12 weeks after having your baby.    Thoughts of hurting yourself or your baby  Pain in your chest or trouble breathing  Severe headache not helped by pain medicine  Eyesight concerns (blurry vision, seeing spots or flashes of light, other changes to eyesight)  Fainting, shaking or other signs of a seizure    Call 9-1-1 if you feel that it is an emergency.     The symptoms below can happen to anyone after giving birth. They can be very serious. Call your provider if you have any of these warning signs.    My provider s phone number: _______________________    Losing too much blood (hemorrhage)    Call your provider if you soak through a pad in less than an hour or pass blood clots bigger than a golf ball. These may be signs that you are bleeding too much.    Blood clots in the legs or lungs    After you give birth, your body naturally clots its blood to help prevent blood loss. Sometimes this increased clotting can happen in other areas of the body, like the legs or lungs. This can block your blood flow and be very dangerous.     Call your provider if you:  Have a red, swollen spot on the back of your leg that is warm or painful when you touch it.   Are coughing up blood.     Infection    Call your provider if you have any of these symptoms:  Fever of 100.4 F (38 C) or higher.  Pain or redness around your stitches if you had an incision.   Any yellow, white, or green fluid coming from places where you had stitches or surgery.    Mood Problems (postpartum depression)    Many people feel sad or have mood changes after having a baby. But for some people, these mood swings are worse.     Call your provider right away if you feel so anxious or nervous that you can't care for yourself or your baby.    Preeclampsia (high blood pressure)    Even if you  didn't have high blood pressure when you were pregnant, you are at risk for the high blood pressure disease called preeclampsia. This risk can last up to 12 weeks after giving birth.     Call your provider if you have:   Pain on your right side under your rib cage  Sudden swelling in the hands and face    Remember: You know your body. If something doesn't feel right, get medical help.     For informational purposes only. Not to replace the advice of your health care provider. Copyright 2020 [facility]. All rights reserved. Clinically reviewed by Nikki Oneal, RNC-OB, MSN. [employer] [id number] - Rev 02/23.

## 2023-05-19 ENCOUNTER — DOCUMENTATION ONLY (OUTPATIENT)
Dept: PEDIATRICS | Facility: CLINIC | Age: 38
End: 2023-05-19
Payer: COMMERCIAL

## 2023-05-19 ENCOUNTER — MEDICAL CORRESPONDENCE (OUTPATIENT)
Dept: HEALTH INFORMATION MANAGEMENT | Facility: CLINIC | Age: 38
End: 2023-05-19
Payer: COMMERCIAL

## 2023-05-19 NOTE — PROGRESS NOTES
"[facility] Lactation Visit    Assessment:     difficulty in feeding at breast     Amira Landin is a 8 day old old female infant born at 40 weeks via vaginal delivery on 2023 here for lactation support.    Amira Landin is doing well. Amira Landin has gained 5.5 oz since their last visit 7 days ago; approximately 0.78 oz per day.  This is adequate weight gain per day and she is now back to birthweight. Main concern today is pinching sensation with initial latch. This improves throughout the nursing session with minimal intervention. Mom concerned about upper lip tie causing pinching sensation and gap teeth in the future. Given improvement of pain, reassurance provided. Discussed proper positioning and ensuring to aim nipple to roof of mouth while gently applying downward pressure on chin to obtain a wider mouth gape. Follow up in 2 weeks as needed.     May follow up in the future for dentistry for concerns for gapped teeth. However, reassurance provided that not all individuals with upper lip tie will result in gapped teeth.      Plan:  Amira is gaining adequate weight since her last visit.    As discussed, below the feeding recommendations for Amira Landin:    Feeding plan: Breast-feed every 2-3 hours or more frequently based on infant cues; at least 8-12 times a day. When latching Amira Landin, make sure head, neck, and body are aligned an facing you. Support breast with \"sandwich\" hold or \"C\" hold while infant is breast-feeding. To obtain a deeper latch, aim the tip of the nipple to infant's roof of the mouth (aim for the nose). Ensure lips are flanged out. If having difficulty, wait for wide gape and gently apply downward pressure to the infant's chin. If latch is painful or lips are pursed in, unlatch Amira Landin and reposition. You can also try to gently pull her upper lip out with your finger to prevent any lip blisters.     Make sure to stimulate Amira MADRID" Urvashi to actively nurse. Listen for swallows. If swallows are less frequent, stimulate infant by tickling her hands or feet. You may also wipe a cool wash cloth on her face or hand express your breast for milk. Also skin-to-skin and undressing Amira Landin down to her diaper can be helpful. Burp Amira Landin before switching sides and burp again after breast-feeding. Keep Amira Landin in upright position for about 10-15 minutes after feeding, before laying her flat on her back.    A typical feeding is 10-15 minutes on each side; total of 20-30 minutes per breast-feeding session.    Supplementation: A typical feeding volume at this age is about 1.5-2 oz. Amira was able to transfer 2.7 oz from the breast today. Only supplement as needed.    Age Average milk volume per feeding (mL) Average milk volume per feeding (oz) Average 24 hour milk intake (mL) Average 24 hour milk intake (oz)   Day 1 Few drops - 5mL < tsp Up to 30 mL Up to 1 oz   Day 2 5 - 15 mL <0.5 oz - 1 TB 30 - 120 mL 1 - 4 oz   Day 3 15 - 30 mL  0.5 - 1 oz 120 - 240 mL 4 - 8 oz   Day 4 30 - 45 mL  1 - 1.5 oz 240 - 360 mL 8 - 12 oz   Day 5-7 45 - 60 mL 1.5 - 2 oz 360 - 600 mL 12 - 18 oz   Week 2-3 60 - 90 mL 2 - 3 oz 450 - 750 mL 15 - 25 oz   Months 1-6 90 - 150 mL 3 - 5 oz 750 - 1035 mL 25 - 35 oz      Pumping plan: pump only as needed for relief of engorgement.    Continue to monitor output, expect at least 6 wet diapers per day and 2-4 stools in a day. If Amira Landin has less than the recommended wet diapers, please call us.     Amira Landin should start Vitamin D 400 internation units, once daily.    Follow up with your primary care provider next week as scheduled.    Maternal nipple care:   Best way to help decrease nipple soreness is to obtain a deep latch. When you pump, nipples should not touch the sides of the flange. Apply lanolin or coconut oil after breast-feeding or pumping. Wipe away left over residue before next  breast-feeding or pumping. Allow nipples to air dry as much as possible to help stimulate healing. If mother is experiencing persistent breast pain, flu-like symptoms, localized breast tenderness/redness/warmness, or fevers, please contact mother's primary care provider or Obstetrician/midwife for further evaluation.    Return to clinic sooner or call clinic sooner for any worsening symptoms (inconsolability, fever greater than 100.4F, less wet diapers, no stools, sleepiness, difficulty feeding, abdominal distention).    For further lactation concerns, please call [phone].     ____________________________________________________________________  SUBJECTIVE:     Amira Landin is a 8 day old old female infant born at Gestational Age: 40w0d via Vaginal, Spontaneous delivery on 2023 at 9:16 PM here for lactation support. This patient is accompanied in the office by her mother.    Concerns: pinching sensation with initial latch. This resolves after 2 minutes. Mom concerned about possible upper lip tie. Older sibling will be getting lip tie revised due to concerns of gap teeth.    Baby is nursing every 2-4 hours for about 20-30 minutes per session.   Mother reports hearing audible swallows.   Baby feeds about 8-9 times in 24 hours and needs to be awaken for feeds.  Baby is not supplementing. Baby has about 8-9 wet diapers and 3-4 stools per day. Stools are yellow in color.    Mom is not pumping. Mom noticed her breasts grew larger and areolas darkened during pregnancy and she noticed primary engorgement when her milk came in on day 3.    Breastfeeding Goals: successful breast-feeding. Less pain    Previous Breastfeeding Experience:  4 older children for up to 1.5 years of age.    Breast-surgery: none    Maternal medications: levothyroxine, labetalol. History hypothyroidism and hypertension. No diabetes.  Infant medications: none.       metabolic screening: normal.    Patient Active Problem List     Diagnosis Date Noted     [place] 2023     Priority: Medium     No results found for any visits on 23.  No current outpatient medications on file.  No past medical history on file.  No past surgical history on file.  No family history on file.    Primary care provider: Nadira Melo    OBJECTIVE:    Mother:   Nipples are everted (short shaft on the left side), the areola is compressible, the breast is soft and full.     Sore nipples: none   Maternal pain: pinching sensation with initial latch; resolves after a 1-2 minutes.    Maternal depression screening: Doing well    Infant:   Vitals:    23 1111   Temp: 99.1  F (37.3  C)   TempSrc: Rectal   Weight: 4.043 kg (8 lb 14.6 oz)       Average weight gain over last 7 days: 5.5 oz (approximately 0.78 oz per day)     Weight:   Birthweight: 8 lbs 14 oz  Clinic weight on : 8 lbs 9.1 oz  Today's weight: 8 lbs 14.6 oz    0% from birth weight.    Amount of milk transferred from LEFT side: 1.1 oz  Amount of milk transferred from RIGHT side: 1.6 oz    Total transfer: 2.7 oz    Feeding assessment:     Infant can draw gloved finger into mouth. Infant demonstrated a coordinated suck. Infant palate is intact, tongue over gums, normal frenulum. Visible thin upper lip frenulum.   Infant can hold suction with tongue while at the breast. Tends to purse upper lip, which is corrected by repositioning and and manipulating upper lip with finger. Infant did not need frequent stimulation at the breast.     Alignment: Infant's head was aligned with its trunk. Infant did face mother. Baby was in foot-ball position today. Discussed importance of infant ventral positioning to obtain a deeper latch.     Areolar Grasp: Infant was able to open mouth wide. Infant was assisted by gently applying downward pressure to the chin to open mouth wide. Infant's lips were not pursed. Infant's lips did flange outward. Tongue was visible just barely over bottom lip. Infant had complete  "seal.     Areolar Compression: Infant made rhythmic motion. There were no clicking or smacking sounds. There was no severe nipple discomfort.  Nipples appeared round after feeding.    Audible swallowing: Infant made quiet sounds of swallowing. There was an increase in frequency after milk ejection reflex.     PHYSICAL EXAM    Gen: Alert, no acute distress.   Head: Anterior and posterior fontanelles are flat and soft.   Eyes: No eye drainage. Sclera clear.   Ears: Pinna appear well-formed. No pits.   Nose: nares patent. No nasal congestion. No nasal flaring.  Mouth: Oral mucosa moist. Tongue midline (tongue elevation adequate when crying, good lateralization). Frenulum palpable. No \"heart-shaped\" tongue. Thin upper lip frenulum. Tongue able to extend pass lower gumline. Lips closed at rest.   Neck: Clavicles intact bilaterally.  Lungs: Clear to auscultation bilaterally.   Cardiac: Regular regular rate and rhythm, S1S2, no murmurs. Brachial and femoral pulses +2 and equal.  Abdomen: Soft, nontender, bowel sounds present, no hepatosplenomegaly or mass palpable. Umbilicus dry with no erythema or drainage.   : Matteo stage 1 female genitalia  Skin: Intact. Dry. No rash. Mild facial jaundice.   Musculoskeletal: equal movements of upper and lower extremities. Negative Ortolani and Moran maneuver.  Neuro: Appropriate muscle tone.    The visit lasted a total of 44 minutes that I spent face to face with the patient. Of that time, 44 minutes were spent on lactation. Over 50% of the time was spent counseling and educating the patient about normal  care and growth, breast-feeding, latching, upper lip frenulum.    Completed by:   Jean-Pierre Brooks, APRN, CPNP, IBCLC  [facility]  [facility]  2023, 11:22 AM        "

## 2023-05-24 NOTE — ADDENDUM NOTE
Addendum  created 05/23/23 1900 by Yen Bingham MD    Clinical Note Signed, Diagnosis association updated, Intraprocedure Blocks edited       PT recs SHAMAR  - coumadin dosing,   - INR daily  - pending auth, has acceptance for SHAMAR PT recs SHAMAR  - coumadin dosing, 7.5mg today  - INR daily PT recs SHAMAR  - Pending behavior optimization  - coumadin dosing  - INR daily

## 2023-09-23 ENCOUNTER — HEALTH MAINTENANCE LETTER (OUTPATIENT)
Age: 38
End: 2023-09-23

## 2024-08-05 ENCOUNTER — MEDICAL CORRESPONDENCE (OUTPATIENT)
Dept: HEALTH INFORMATION MANAGEMENT | Facility: CLINIC | Age: 39
End: 2024-08-05
Payer: COMMERCIAL

## 2024-08-05 ENCOUNTER — TRANSCRIBE ORDERS (OUTPATIENT)
Dept: OTHER | Age: 39
End: 2024-08-05

## 2024-08-05 DIAGNOSIS — L02.213 CHEST WALL ABSCESS: Primary | ICD-10-CM

## 2024-08-08 ENCOUNTER — TELEPHONE (OUTPATIENT)
Dept: SURGERY | Facility: CLINIC | Age: 39
End: 2024-08-08
Payer: COMMERCIAL

## 2024-08-08 NOTE — TELEPHONE ENCOUNTER
Patient was contacted for scheduling via referral from PCP who she saw this Monday for abscess between breasts discovered last Sunday. The patient was offered soonest appointment which was 08/12/24 but declined as her PCP wants her to be seen earlier or go to the Emergency Department. She reports starting antibiotic on Monday and has seen no improvement. Area is still red and has grown slightly. I encouraged her to follow-up in ED for possible I&D based on symptoms presented verbally after consulting with Dr. Mccormick. Encouraged the patient to follow-up with our general surgery team if needed. Patient was appreciative of call and advice.     Ness Gray RN on 8/8/2024 at 3:10 PM

## 2024-08-12 ENCOUNTER — HOSPITAL ENCOUNTER (EMERGENCY)
Facility: HOSPITAL | Age: 39
Discharge: LEFT BEFORE DISCHARGE | End: 2024-08-12
Admitting: PHYSICIAN ASSISTANT
Payer: COMMERCIAL

## 2024-08-12 ENCOUNTER — APPOINTMENT (OUTPATIENT)
Dept: ULTRASOUND IMAGING | Facility: HOSPITAL | Age: 39
End: 2024-08-12
Payer: COMMERCIAL

## 2024-08-12 ENCOUNTER — HOSPITAL ENCOUNTER (OUTPATIENT)
Facility: HOSPITAL | Age: 39
Setting detail: OBSERVATION
Discharge: HOME | End: 2024-08-13
Attending: EMERGENCY MEDICINE | Admitting: RADIOLOGY
Payer: COMMERCIAL

## 2024-08-12 VITALS
HEIGHT: 65 IN | RESPIRATION RATE: 12 BRPM | BODY MASS INDEX: 31.65 KG/M2 | SYSTOLIC BLOOD PRESSURE: 164 MMHG | HEART RATE: 99 BPM | WEIGHT: 190 LBS | DIASTOLIC BLOOD PRESSURE: 115 MMHG | OXYGEN SATURATION: 99 % | TEMPERATURE: 98.4 F

## 2024-08-12 DIAGNOSIS — L02.91 ABSCESS: ICD-10-CM

## 2024-08-12 LAB
ANION GAP SERPL CALCULATED.3IONS-SCNC: 16 MMOL/L (ref 7–15)
BASOPHILS # BLD AUTO: 0 10E3/UL (ref 0–0.2)
BASOPHILS NFR BLD AUTO: 1 %
BUN SERPL-MCNC: 10.4 MG/DL (ref 6–20)
CALCIUM SERPL-MCNC: 10 MG/DL (ref 8.8–10.4)
CHLORIDE SERPL-SCNC: 102 MMOL/L (ref 98–107)
CREAT SERPL-MCNC: 0.73 MG/DL (ref 0.51–0.95)
EGFRCR SERPLBLD CKD-EPI 2021: >90 ML/MIN/1.73M2
EOSINOPHIL # BLD AUTO: 0.3 10E3/UL (ref 0–0.7)
EOSINOPHIL NFR BLD AUTO: 6 %
ERYTHROCYTE [DISTWIDTH] IN BLOOD BY AUTOMATED COUNT: 12.3 % (ref 10–15)
GLUCOSE SERPL-MCNC: 102 MG/DL (ref 70–99)
HBA1C MFR BLD: 4.8 %
HCG INTACT+B SERPL-ACNC: <1 MIU/ML
HCO3 SERPL-SCNC: 23 MMOL/L (ref 22–29)
HCT VFR BLD AUTO: 39.7 % (ref 35–47)
HGB BLD-MCNC: 13.7 G/DL (ref 11.7–15.7)
IMM GRANULOCYTES # BLD: 0 10E3/UL
IMM GRANULOCYTES NFR BLD: 0 %
LYMPHOCYTES # BLD AUTO: 1.1 10E3/UL (ref 0.8–5.3)
LYMPHOCYTES NFR BLD AUTO: 19 %
MAGNESIUM SERPL-MCNC: 2.2 MG/DL (ref 1.7–2.3)
MCH RBC QN AUTO: 29.7 PG (ref 26.5–33)
MCHC RBC AUTO-ENTMCNC: 34.5 G/DL (ref 31.5–36.5)
MCV RBC AUTO: 86 FL (ref 78–100)
MONOCYTES # BLD AUTO: 0.5 10E3/UL (ref 0–1.3)
MONOCYTES NFR BLD AUTO: 10 %
NEUTROPHILS # BLD AUTO: 3.7 10E3/UL (ref 1.6–8.3)
NEUTROPHILS NFR BLD AUTO: 65 %
NRBC # BLD AUTO: 0 10E3/UL
NRBC BLD AUTO-RTO: 0 /100
PHOSPHATE SERPL-MCNC: 4.5 MG/DL (ref 2.5–4.5)
PLATELET # BLD AUTO: 239 10E3/UL (ref 150–450)
POTASSIUM SERPL-SCNC: 3.9 MMOL/L (ref 3.4–5.3)
RBC # BLD AUTO: 4.62 10E6/UL (ref 3.8–5.2)
SODIUM SERPL-SCNC: 141 MMOL/L (ref 135–145)
TSH SERPL DL<=0.005 MIU/L-ACNC: 0.74 UIU/ML (ref 0.3–4.2)
WBC # BLD AUTO: 5.7 10E3/UL (ref 4–11)

## 2024-08-12 PROCEDURE — 83735 ASSAY OF MAGNESIUM: CPT | Performed by: STUDENT IN AN ORGANIZED HEALTH CARE EDUCATION/TRAINING PROGRAM

## 2024-08-12 PROCEDURE — 85025 COMPLETE CBC W/AUTO DIFF WBC: CPT

## 2024-08-12 PROCEDURE — 99223 1ST HOSP IP/OBS HIGH 75: CPT | Performed by: STUDENT IN AN ORGANIZED HEALTH CARE EDUCATION/TRAINING PROGRAM

## 2024-08-12 PROCEDURE — 87641 MR-STAPH DNA AMP PROBE: CPT | Performed by: EMERGENCY MEDICINE

## 2024-08-12 PROCEDURE — 250N000011 HC RX IP 250 OP 636

## 2024-08-12 PROCEDURE — 76642 ULTRASOUND BREAST LIMITED: CPT | Mod: RT

## 2024-08-12 PROCEDURE — 80048 BASIC METABOLIC PNL TOTAL CA: CPT

## 2024-08-12 PROCEDURE — 83036 HEMOGLOBIN GLYCOSYLATED A1C: CPT | Performed by: STUDENT IN AN ORGANIZED HEALTH CARE EDUCATION/TRAINING PROGRAM

## 2024-08-12 PROCEDURE — 96376 TX/PRO/DX INJ SAME DRUG ADON: CPT

## 2024-08-12 PROCEDURE — 36415 COLL VENOUS BLD VENIPUNCTURE: CPT

## 2024-08-12 PROCEDURE — 96365 THER/PROPH/DIAG IV INF INIT: CPT

## 2024-08-12 PROCEDURE — 99285 EMERGENCY DEPT VISIT HI MDM: CPT | Mod: 25

## 2024-08-12 PROCEDURE — 258N000003 HC RX IP 258 OP 636: Performed by: EMERGENCY MEDICINE

## 2024-08-12 PROCEDURE — 120N000001 HC R&B MED SURG/OB

## 2024-08-12 PROCEDURE — 250N000013 HC RX MED GY IP 250 OP 250 PS 637: Performed by: STUDENT IN AN ORGANIZED HEALTH CARE EDUCATION/TRAINING PROGRAM

## 2024-08-12 PROCEDURE — 84702 CHORIONIC GONADOTROPIN TEST: CPT

## 2024-08-12 PROCEDURE — 76642 ULTRASOUND BREAST LIMITED: CPT | Mod: 50

## 2024-08-12 PROCEDURE — 84100 ASSAY OF PHOSPHORUS: CPT | Performed by: STUDENT IN AN ORGANIZED HEALTH CARE EDUCATION/TRAINING PROGRAM

## 2024-08-12 PROCEDURE — 99281 EMR DPT VST MAYX REQ PHY/QHP: CPT

## 2024-08-12 PROCEDURE — 250N000011 HC RX IP 250 OP 636: Performed by: EMERGENCY MEDICINE

## 2024-08-12 PROCEDURE — 82310 ASSAY OF CALCIUM: CPT

## 2024-08-12 PROCEDURE — 84443 ASSAY THYROID STIM HORMONE: CPT | Performed by: STUDENT IN AN ORGANIZED HEALTH CARE EDUCATION/TRAINING PROGRAM

## 2024-08-12 RX ORDER — PRENATAL VIT/IRON FUM/FOLIC AC 27MG-0.8MG
1 TABLET ORAL DAILY
Status: DISCONTINUED | OUTPATIENT
Start: 2024-08-13 | End: 2024-08-13 | Stop reason: HOSPADM

## 2024-08-12 RX ORDER — LIDOCAINE 40 MG/G
CREAM TOPICAL
Status: DISCONTINUED | OUTPATIENT
Start: 2024-08-12 | End: 2024-08-13 | Stop reason: HOSPADM

## 2024-08-12 RX ORDER — ONDANSETRON 2 MG/ML
4 INJECTION INTRAMUSCULAR; INTRAVENOUS EVERY 6 HOURS PRN
Status: DISCONTINUED | OUTPATIENT
Start: 2024-08-12 | End: 2024-08-13 | Stop reason: HOSPADM

## 2024-08-12 RX ORDER — SULFAMETHOXAZOLE/TRIMETHOPRIM 800-160 MG
1 TABLET ORAL 2 TIMES DAILY
COMMUNITY
Start: 2024-08-08 | End: 2024-08-15

## 2024-08-12 RX ORDER — ONDANSETRON 4 MG/1
4 TABLET, ORALLY DISINTEGRATING ORAL EVERY 6 HOURS PRN
Status: DISCONTINUED | OUTPATIENT
Start: 2024-08-12 | End: 2024-08-13 | Stop reason: HOSPADM

## 2024-08-12 RX ORDER — AMOXICILLIN 250 MG
2 CAPSULE ORAL 2 TIMES DAILY PRN
Status: DISCONTINUED | OUTPATIENT
Start: 2024-08-12 | End: 2024-08-13 | Stop reason: HOSPADM

## 2024-08-12 RX ORDER — HYDROMORPHONE HCL IN WATER/PF 6 MG/30 ML
0.4 PATIENT CONTROLLED ANALGESIA SYRINGE INTRAVENOUS
Status: DISCONTINUED | OUTPATIENT
Start: 2024-08-12 | End: 2024-08-13 | Stop reason: HOSPADM

## 2024-08-12 RX ORDER — HYDROMORPHONE HCL IN WATER/PF 6 MG/30 ML
0.2 PATIENT CONTROLLED ANALGESIA SYRINGE INTRAVENOUS
Status: DISCONTINUED | OUTPATIENT
Start: 2024-08-12 | End: 2024-08-13 | Stop reason: HOSPADM

## 2024-08-12 RX ORDER — AMOXICILLIN 250 MG
1 CAPSULE ORAL 2 TIMES DAILY PRN
Status: DISCONTINUED | OUTPATIENT
Start: 2024-08-12 | End: 2024-08-13 | Stop reason: HOSPADM

## 2024-08-12 RX ORDER — OXYCODONE HYDROCHLORIDE 5 MG/1
5 TABLET ORAL EVERY 4 HOURS PRN
Status: DISCONTINUED | OUTPATIENT
Start: 2024-08-12 | End: 2024-08-13 | Stop reason: HOSPADM

## 2024-08-12 RX ORDER — CEFAZOLIN SODIUM 1 G/50ML
1250 SOLUTION INTRAVENOUS EVERY 12 HOURS
Status: DISCONTINUED | OUTPATIENT
Start: 2024-08-13 | End: 2024-08-13

## 2024-08-12 RX ORDER — LIOTHYRONINE SODIUM 5 UG/1
10 TABLET ORAL DAILY
COMMUNITY

## 2024-08-12 RX ORDER — ACETAMINOPHEN 650 MG/1
650 SUPPOSITORY RECTAL EVERY 4 HOURS PRN
Status: DISCONTINUED | OUTPATIENT
Start: 2024-08-12 | End: 2024-08-13 | Stop reason: HOSPADM

## 2024-08-12 RX ORDER — PIPERACILLIN SODIUM, TAZOBACTAM SODIUM 3; .375 G/15ML; G/15ML
3.38 INJECTION, POWDER, LYOPHILIZED, FOR SOLUTION INTRAVENOUS ONCE
Status: DISCONTINUED | OUTPATIENT
Start: 2024-08-12 | End: 2024-08-12

## 2024-08-12 RX ORDER — PIPERACILLIN SODIUM, TAZOBACTAM SODIUM 3; .375 G/15ML; G/15ML
3.38 INJECTION, POWDER, LYOPHILIZED, FOR SOLUTION INTRAVENOUS EVERY 8 HOURS
Status: DISCONTINUED | OUTPATIENT
Start: 2024-08-13 | End: 2024-08-13

## 2024-08-12 RX ORDER — PIPERACILLIN SODIUM, TAZOBACTAM SODIUM 3; .375 G/15ML; G/15ML
3.38 INJECTION, POWDER, LYOPHILIZED, FOR SOLUTION INTRAVENOUS ONCE
Status: COMPLETED | OUTPATIENT
Start: 2024-08-12 | End: 2024-08-12

## 2024-08-12 RX ORDER — LABETALOL 100 MG/1
100 TABLET, FILM COATED ORAL 2 TIMES DAILY
Status: DISCONTINUED | OUTPATIENT
Start: 2024-08-12 | End: 2024-08-13 | Stop reason: HOSPADM

## 2024-08-12 RX ORDER — LIOTHYRONINE SODIUM 5 UG/1
10 TABLET ORAL DAILY
Status: DISCONTINUED | OUTPATIENT
Start: 2024-08-13 | End: 2024-08-13 | Stop reason: HOSPADM

## 2024-08-12 RX ORDER — LEVOTHYROXINE SODIUM 25 UG/1
50 TABLET ORAL DAILY
Status: DISCONTINUED | OUTPATIENT
Start: 2024-08-13 | End: 2024-08-13 | Stop reason: HOSPADM

## 2024-08-12 RX ORDER — ACETAMINOPHEN 325 MG/1
650 TABLET ORAL EVERY 4 HOURS PRN
Status: DISCONTINUED | OUTPATIENT
Start: 2024-08-12 | End: 2024-08-13 | Stop reason: HOSPADM

## 2024-08-12 RX ORDER — CALCIUM CARBONATE 500 MG/1
1000 TABLET, CHEWABLE ORAL 4 TIMES DAILY PRN
Status: DISCONTINUED | OUTPATIENT
Start: 2024-08-12 | End: 2024-08-13 | Stop reason: HOSPADM

## 2024-08-12 RX ORDER — HYDRALAZINE HYDROCHLORIDE 20 MG/ML
10 INJECTION INTRAMUSCULAR; INTRAVENOUS EVERY 6 HOURS PRN
Status: DISCONTINUED | OUTPATIENT
Start: 2024-08-12 | End: 2024-08-13 | Stop reason: HOSPADM

## 2024-08-12 RX ORDER — FERROUS SULFATE 325(65) MG
324 TABLET ORAL
Status: DISCONTINUED | OUTPATIENT
Start: 2024-08-13 | End: 2024-08-13

## 2024-08-12 RX ORDER — CEFAZOLIN SODIUM 1 G/50ML
1750 SOLUTION INTRAVENOUS ONCE
Status: COMPLETED | OUTPATIENT
Start: 2024-08-12 | End: 2024-08-12

## 2024-08-12 RX ADMIN — ACETAMINOPHEN 650 MG: 325 TABLET ORAL at 21:06

## 2024-08-12 RX ADMIN — SODIUM CHLORIDE 1750 MG: 9 INJECTION, SOLUTION INTRAVENOUS at 19:56

## 2024-08-12 RX ADMIN — PIPERACILLIN AND TAZOBACTAM 3.38 G: 3; .375 INJECTION, POWDER, FOR SOLUTION INTRAVENOUS at 18:56

## 2024-08-12 RX ADMIN — LABETALOL HYDROCHLORIDE 100 MG: 100 TABLET, FILM COATED ORAL at 21:48

## 2024-08-12 ASSESSMENT — COLUMBIA-SUICIDE SEVERITY RATING SCALE - C-SSRS
2. HAVE YOU ACTUALLY HAD ANY THOUGHTS OF KILLING YOURSELF IN THE PAST MONTH?: NO
6. HAVE YOU EVER DONE ANYTHING, STARTED TO DO ANYTHING, OR PREPARED TO DO ANYTHING TO END YOUR LIFE?: NO
2. HAVE YOU ACTUALLY HAD ANY THOUGHTS OF KILLING YOURSELF IN THE PAST MONTH?: NO
6. HAVE YOU EVER DONE ANYTHING, STARTED TO DO ANYTHING, OR PREPARED TO DO ANYTHING TO END YOUR LIFE?: NO
1. IN THE PAST MONTH, HAVE YOU WISHED YOU WERE DEAD OR WISHED YOU COULD GO TO SLEEP AND NOT WAKE UP?: NO
1. IN THE PAST MONTH, HAVE YOU WISHED YOU WERE DEAD OR WISHED YOU COULD GO TO SLEEP AND NOT WAKE UP?: NO

## 2024-08-12 ASSESSMENT — ACTIVITIES OF DAILY LIVING (ADL)
ADLS_ACUITY_SCORE: 35

## 2024-08-12 NOTE — ED TRIAGE NOTES
Pt coming in with right breast pain , pt states has an abscess and is painful to touch.  Hx of MRSA  Not draining at this time.  Temp this morning 99 along with chills.  Pt states was here earlier but left before being seen and received a call to come back so she did.

## 2024-08-12 NOTE — ED PROVIDER NOTES
EMERGENCY DEPARTMENT ENCOUNTER      NAME: Chandni Landin  AGE: 39 year old female  YOB: 1985  MRN: [id number]  EVALUATION DATE & TIME: No admission date for patient encounter.    PCP: Nadira Melo    ED PROVIDER: Jo Ann Rosenbaum PA-C      Chief Complaint   Patient presents with    Breast Pain     FINAL IMPRESSION:  1. Abscess      ED COURSE & MEDICAL DECISION MAKING:    Pertinent Labs & Imaging studies reviewed. (See chart for details)  39 year old female presents to the Emergency Department for evaluation of breast abscess.  On 8/5/2024 patient was seen by her primary care doctor after she felt the lump on her right breast.  Patient was diagnosed with an abscess and started on Augmentin.  Patient contacted general surgery on 8/8 who advised her to go to the emergency room for IV antibiotics after abscess continued to be painful.  On 8/8 patient was seen at [facility].  Per chart review during her [facility] ED visit patient's abscess was drained and patient was started on Bactrim.  Since leaving [facility] emergency room patient has had continued pain around her abscess.  Patient reports today she had fevers and chills at home. Abscess is not draining. Vital signs reviewed and patient is hypertensive.  Afebrile.  On exam patient has a silver dollar size abscess at the 2 o'clock position on her right breast.  With surrounding fluctuance, erythema, warmth.  No drainage.  Patient moves all 4 extremities without difficulty.  Cardiac and pulm exams unremarkable.  Abdomen is soft and nontender.    CBC shows no count elevation.  Hemoglobin is stable.  Basic shows normal sodium and potassium.  Patient is not pregnant. US of the breast shows at the 2 o'clock position approximately 11 cm from the nipple a complex 3.3 cm fluid collection suspicious for an abscess.  I spoke with general surgery who recommended patient be admitted for IV antibiotics.  General surgery recommended starting Vanco and Zosyn.  Spoke  "with pharmacy about vancomycin and Zosyn and breastfeeding.  Pharmacy recommended \"pump and dumping\".  Patient was educated on the importance of not breast-feeding while on the antibiotics.  Patient agrees with the plan.  General surgery also recommended IR be consulted for possible drain placement.  I spoke with IR who was made aware of the patient and agrees with the plan for a drain placement.  I spoke with the hospitalist who agrees to admit the patient.  Patient was educated on the results and agrees with the plan.  All questions answered.      ED COURSE:   4:26 PM I saw the patient.   4:29 PM I staffed the patient with Dr. Cheek.   5:15 PM Spoke with General Surgery who will see the patient once admitted.   5:54 PM spoke with the hospitalist who agrees to admit the patient for IV antibiotics.  I will consult IR.  Patient was updated on her treatment plan and she is agreeable.  All questions answered.  6:32 PM Spoke with the patient about the treatment plan.  Patient is agreeable.  Patient is educated on results.  Patient was made aware that she should not breast-feed while taking her antibiotics.        At the conclusion of the encounter I discussed the results of all of the tests and the disposition. The questions were answered. The patient or family acknowledged understanding and was agreeable with the care plan.     0 minutes of critical care time       Medical Decision Making  Obtained supplemental history:Supplemental history obtained?: No  Reviewed external records: External records reviewed?: Documented in chart  Care impacted by chronic illness:Hypertension  Care significantly affected by social determinants of health:N/A  Did you consider but not order tests?: Work up considered but not performed and documented in chart, if applicable  Did you interpret images independently?: Independent interpretation of ECG and images noted in documentation, when applicable.  Consultation discussion with other " provider:Did you involve another provider (consultant, , pharmacy, etc.)?: I discussed the care with another health care provider, see documentation for details.  Admit.      MEDICATIONS GIVEN IN THE EMERGENCY:  Medications   vancomycin (VANCOCIN) 1,750 mg in sodium chloride 0.9 % 500 mL intermittent infusion (has no administration in time range)   piperacillin-tazobactam (ZOSYN) 3.375 g vial to attach to  mL bag (3.375 g Intravenous $New Bag 8/12/24 9189)       NEW PRESCRIPTIONS STARTED AT TODAY'S ER VISIT  New Prescriptions    No medications on file          =================================================================    HPI    Patient information was obtained from: The patient     Use of : N/A         Chandni Landin is a 39 year old female with a pertinent history of HTN who presents to this ED by private vehicle for evaluation of breast pain.    Per Chart Review: The patient was seen by her PCP on 8/5/2024 for evaluation of right breat pain and lump. A referral was placed to general surgery and she was placed on Augmentin. She was then seen at [facility] Emergency Department on 8/8/2024 for a right breast abscess. She had an IND preformed and the patient was placed on Bactrim. She was discharged in a stable condition.     2 weeks ago the patient noticed right breast pain and a lump in her right breast. She was seen at her primary care clinic and prescribed Augmentin. Her pain and symptoms continued to worsen and the patient was then seen at [facility] where she had an abscess drained and was started on bactrim. She has taken the Augmentin and Bactrim as prescribed. She continues to have pain and worsening redness and warmth to her left breast. This morning she had a fever of 99 with chills. She has been refereed to general surgery but has not been able to get an appointment. She was sent to the ED for IV antibiotics.       REVIEW OF SYSTEMS   As per HPI    PAST MEDICAL HISTORY:  Past Medical  History:   Diagnosis Date    Acne     Contraceptive management     Depressive disorder, not elsewhere classified 2003    TX'd with Prozac, resolved    Hyperhydrosis disorder     Left axilla & palm,  Robinul-Forte 2mg    Premenstrual syndrome (PMS)        PAST SURGICAL HISTORY:  Past Surgical History:   Procedure Laterality Date    DILATION AND CURETTAGE N/A 12/15/2014    Procedure: SUCTION CURETTAGE ;  Surgeon: Maulik Greenwood MD;  Location: [facility];  Service:     TONSILLECTOMY  1997    TONSILLECTOMY  1995    WISDOM TOOTH EXTRACTION  2005           CURRENT MEDICATIONS:    amoxicillin-clavulanate (AUGMENTIN) 875-125 MG tablet  ferrous gluconate (FERGON) 324 (38 Fe) MG tablet  ibuprofen (ADVIL/MOTRIN) 200 MG tablet  labetalol (NORMODYNE) 100 MG tablet  levothyroxine (SYNTHROID/LEVOTHROID) 50 MCG tablet  liothyronine (CYTOMEL) 5 MCG tablet  Prenatal Vit-Fe Fumarate-FA (PRENATAL MULTIVITAMIN W/IRON) 27-0.8 MG tablet  sulfamethoxazole-trimethoprim (BACTRIM DS) 800-160 MG tablet        ALLERGIES:  No Known Allergies    FAMILY HISTORY:  Family History   Problem Relation Age of Onset    Prostate Cancer Paternal Grandfather     Cerebrovascular Disease Paternal Grandfather     Heart Disease Paternal Grandfather         MI    Hypertension Mother     Respiratory Sister         asthma    Thyroid Disease Mother     Lipids Father        SOCIAL HISTORY:   Social History     Socioeconomic History    Marital status:     Number of children: 0    Years of education: 16   Occupational History    Occupation: Care Coord     Employer: [employer]   Tobacco Use    Smoking status: Never    Smokeless tobacco: Never   Substance and Sexual Activity    Alcohol use: No     Comment: 2 drinks per mth    Drug use: No    Sexual activity: Yes     Partners: Male   Other Topics Concern     Service No    Blood Transfusions No    Caffeine Concern No     Comment: 1 soda per day    Occupational Exposure No    Hobby  Hazards No    Sleep Concern No    Stress Concern No    Weight Concern No    Special Diet Yes     Comment: milk    Back Care No    Exercise Yes     Comment: 3 times per week - swim or run    Bike Helmet No     Comment: N/A    Seat Belt Yes    Self-Exams No       VITALS:  BP (!) 144/101   Pulse 88   Temp 98.5  F (36.9  C) (Oral)   Resp 16   Wt 83.9 kg (185 lb)   SpO2 98%   BMI 30.79 kg/m      PHYSICAL EXAM    Physical Exam  Vitals and nursing note reviewed.   Constitutional:       Appearance: Normal appearance.   HENT:      Head: Atraumatic.      Right Ear: External ear normal.      Left Ear: External ear normal.      Nose: Nose normal.      Mouth/Throat:      Mouth: Mucous membranes are moist.   Eyes:      Conjunctiva/sclera: Conjunctivae normal.      Pupils: Pupils are equal, round, and reactive to light.   Cardiovascular:      Rate and Rhythm: Normal rate and regular rhythm.      Pulses: Normal pulses.      Heart sounds: Normal heart sounds. No murmur heard.     No friction rub. No gallop.   Pulmonary:      Effort: Pulmonary effort is normal.      Breath sounds: Normal breath sounds. No wheezing or rales.   Abdominal:      Tenderness: There is no abdominal tenderness. There is no guarding or rebound.   Musculoskeletal:      Cervical back: Normal range of motion. No rigidity or tenderness.   Lymphadenopathy:      Cervical: No cervical adenopathy.   Skin:     General: Skin is dry.      Comments: Silver dollar abscess at the 2 o'clock position of her right breast with overlying tenderness, fluctuance, surrounding erythema, warmth.  No edema.  No drainage.   Neurological:      General: No focal deficit present.      Mental Status: She is alert and oriented to person, place, and time. Mental status is at baseline.   Psychiatric:         Mood and Affect: Mood normal.         Thought Content: Thought content normal.          LAB:  All pertinent labs reviewed and interpreted.  Labs Ordered and Resulted from Time of  ED Arrival to Time of ED Departure   BASIC METABOLIC PANEL - Abnormal       Result Value    Sodium 141      Potassium 3.9      Chloride 102      Carbon Dioxide (CO2) 23      Anion Gap 16 (*)     Urea Nitrogen 10.4      Creatinine 0.73      GFR Estimate >90      Calcium 10.0      Glucose 102 (*)    HCG QUANTITATIVE PREGNANCY - Normal    hCG Quantitative <1     CBC WITH PLATELETS AND DIFFERENTIAL    WBC Count 5.7      RBC Count 4.62      Hemoglobin 13.7      Hematocrit 39.7      MCV 86      MCH 29.7      MCHC 34.5      RDW 12.3      Platelet Count 239      % Neutrophils 65      % Lymphocytes 19      % Monocytes 10      % Eosinophils 6      % Basophils 1      % Immature Granulocytes 0      NRBCs per 100 WBC 0      Absolute Neutrophils 3.7      Absolute Lymphocytes 1.1      Absolute Monocytes 0.5      Absolute Eosinophils 0.3      Absolute Basophils 0.0      Absolute Immature Granulocytes 0.0      Absolute NRBCs 0.0          RADIOLOGY:  Reviewed all pertinent imaging. Please see official radiology report.  US Breast Bilateral Limited 1-3 Quadrants   Preliminary Result   IMPRESSION: In the right breast, at the 2:00 position, approximately 11 cm from the nipple, complex 3.3 cm fluid collection, suspicious for abscess.      Preliminary Interpretation Dictated By: Wayne Tavarez MD   Date: 08/12/2024          I, Eryn Cruz, am serving as a scribe to document services personally performed by Jo Ann Rosenbaum PA-C, based on my observation and the provider's statements to me. I, Jo Ann Rosenbaum PA-C, attest that Eryn Cruz is acting in a scribe capacity, has observed my performance of the services and has documented them in accordance with my direction.    Jo Ann Rosenbaum PA-C  [facility] EMERGENCY DEPARTMENT  [address] [place] [id number]  [phone]     Jo Ann Rosenbaum PA-C  08/19/24 3340

## 2024-08-12 NOTE — ED PROVIDER NOTES
ED Provider Note:    3:37 PM I went to have my initial evaluation with the patient but she left the emergency room without being seen. Patient left the ED after being triaged and I did not see the patient.  I called the patient and advised her to come back to the emergency room to start her evaluation.  Patient agreed to return to the emergency room. Patient plans to return to the ED in the next 10 minutes.     Jo Ann Gibson PA-C, PA-C  08/12/24 1543

## 2024-08-12 NOTE — MEDICATION SCRIBE - ADMISSION MEDICATION HISTORY
Medication Scribe Admission Medication History    Admission medication history is complete. The information provided in this note is only as accurate as the sources available at the time of the update.    Information Source(s): Patient via in-person    Pertinent Information: patient reports self management of medications.     Patient reports no longer taking: Tylenol, Calcium w D    Changes made to PTA medication list:  Added: Augmentin, Bactrim, Cytomel  Deleted: Tylenol, Calcium w D  Changed: None    Allergies reviewed with patient and updates made in EHR: yes    Medication History Completed By: Alexander Hart 8/12/2024 6:58 PM    PTA Med List   Medication Sig Last Dose    amoxicillin-clavulanate (AUGMENTIN) 875-125 MG tablet Take 1 tablet by mouth 2 times daily X 10 days. Started 8/5/24 PM 8/12/2024 at am    ferrous gluconate (FERGON) 324 (38 Fe) MG tablet Take 324 mg by mouth daily (with breakfast) Past Month at unknown    ibuprofen (ADVIL/MOTRIN) 200 MG tablet Take 4 tablets (800 mg) by mouth every 6 hours as needed for other (cramping) 8/12/2024 at am    labetalol (NORMODYNE) 100 MG tablet Take 100 mg by mouth 2 times daily 8/12/2024 at am    levothyroxine (SYNTHROID/LEVOTHROID) 50 MCG tablet Take 50 mcg by mouth daily 8/12/2024 at am    liothyronine (CYTOMEL) 5 MCG tablet Take 10 mcg by mouth daily 8/12/2024 at am    Prenatal Vit-Fe Fumarate-FA (PRENATAL MULTIVITAMIN W/IRON) 27-0.8 MG tablet Take 1 tablet by mouth daily Past Week at unknown    sulfamethoxazole-trimethoprim (BACTRIM DS) 800-160 MG tablet Take 1 tablet by mouth 2 times daily X 7 days. Started 8/8/24 PM 8/12/2024 at am

## 2024-08-12 NOTE — ED PROVIDER NOTES
Emergency Department Midlevel Supervisory Note     I had a face to face encounter with this patient seen by the Advanced Practice Provider (JORGE). I personally made/approved the management plan and take responsibility for the patient management. I personally saw patient and performed a substantive portion of the visit including all aspects of the medical decision making.     ED Course:  4:29 PM Jo Ann Rosenbaum PA-C staffed patient with me. I agree with their assessment and plan of management, and I will see the patient.  4:55 PM I met with the patient to introduce myself, gather additional history, perform my initial exam, and discuss the plan.     Brief HPI:     Chandni Landin is a 39 year old female who presents for evaluation of breast pain.    Patient noticed right breast pain and a lump in her right breast 2 weeks ago. She was seen by her primary care clinic and prescribed Augmentin. Patient's pain and other symptoms continued to worsen following this, leading her to be seen at [facility] where she had an abscess drained and was subsequently started on Bactrim. Patient continues to have pain and worsening redness/warmth to her left breast. He had a fever of 99 this morning with chills. Patient presented to the emergency department for IV antibiotics.     I, Gabe Street, am serving as a scribe to document services personally performed by Dr. Fabio Cheek, based on my observations and the provider's statements to me.   I, Fabio Cheek MD, attest that Gabe Street was acting in a scribe capacity, has observed my performance of the services and has documented them in accordance with my direction.    Brief Physical Exam: BP (!) 144/101   Pulse 88   Temp 98.5  F (36.9  C) (Oral)   Resp 16   Wt 83.9 kg (185 lb)   SpO2 98%   BMI 30.79 kg/m    Constitutional:  Alert, in no acute distress  HENT:  Atraumatic  Respiratory:  Respirations even, unlabored, in no acute respiratory distress  Cardiovascular:   Regular rate and rhythm, good peripheral perfusion  GI: Soft, non-distended, non-tender       MDM:  Medical Decision Making  Obtained supplemental history:Supplemental history obtained?: No  Reviewed external records: External records reviewed?: No  Care impacted by chronic illness:Hypertension  Care significantly affected by social determinants of health:N/A  Did you consider but not order tests?: Work up considered but not performed and documented in chart, if applicable  Did you interpret images independently?: Independent interpretation of ECG and images noted in documentation, when applicable.  Consultation discussion with other provider:Did you involve another provider (consultant, , pharmacy, etc.)?: No   but Jo Ann discussed the patient's case with general surgery who wanted the patient admitted and have IR place a drain.    Admit.        1. Abscess        I have reviewed the relevant laboratory studies above.    I independently interpreted the following imaging study(s):         Procedures:  I was present for the key portions of procedures documented in JORGE/midlevel note, see midlevel note for further details.    Fabio Cheek MD  [facility] EMERGENCY DEPARTMENT  [address] [place] [id number] [phone]       Fabio Cheek MD  08/12/24 2432

## 2024-08-12 NOTE — PHARMACY-ADMISSION MEDICATION HISTORY
Pharmacy Vancomycin Initial Note  Date of Service 2024  Patient's  1985  39 year old, female    Indication: Abscess and Skin and Soft Tissue Infection    Current estimated CrCl = Estimated Creatinine Clearance: 110.7 mL/min (based on SCr of 0.73 mg/dL).    Creatinine for last 3 days  2024:  5:25 PM Creatinine 0.73 mg/dL    Recent Vancomycin Level(s) for last 3 days  No results found for requested labs within last 3 days.      Vancomycin IV Administrations (past 72 hours)        No vancomycin orders with administrations in past 72 hours.                    Nephrotoxins and other renal medications (From now, onward)      Start     Dose/Rate Route Frequency Ordered Stop    24 1900  vancomycin (VANCOCIN) 1,750 mg in sodium chloride 0.9 % 500 mL intermittent infusion         1,750 mg  over 2 Hours Intravenous ONCE 24 1836      24 1830  piperacillin-tazobactam (ZOSYN) 3.375 g vial to attach to  mL bag         3.375 g  over 30 Minutes Intravenous ONCE 24 1826              Contrast Orders - past 72 hours (72h ago, onward)      None            Plan:  Start vancomycin 1750 mg IV x 1    Reva De Leon [facility]

## 2024-08-12 NOTE — TELEPHONE ENCOUNTER
Patient called back and reports going to the ER 08/08/2024 to have the abscess between her breasts drained. She reports that the area was drained, it is now red and swollen again. She also reports the culture shows MRSA. She has been put on 2 antibiotics and is unsure what she should do as she has now developed a fever. After consulting with the [facility] JORGE's it was advised that the patient go back to the ER as IV antibiotics might be warranted.     A return call was placed to the patient to discuss this. She was appreciative of call back and will plan to go to the ER today. She had questions regarding to breast feeding throughout. I have let her know that it would likely not be advised but that ultimately she would need to check with her PCP or OB/GYN.      Ness Gray RN on 8/12/2024 at 12:18 PM

## 2024-08-12 NOTE — ED TRIAGE NOTES
Pt is breastfeeding and developed a right breast abcsess last Monday and was started on Augmentin.  Last Thursday her antibiotic was changed to bactrim and her breast was drained as it was not improving.  Pt is here today as she felt feverish. The pain is less now,however.

## 2024-08-13 ENCOUNTER — APPOINTMENT (OUTPATIENT)
Dept: ULTRASOUND IMAGING | Facility: HOSPITAL | Age: 39
End: 2024-08-13
Attending: RADIOLOGY
Payer: COMMERCIAL

## 2024-08-13 VITALS
OXYGEN SATURATION: 97 % | DIASTOLIC BLOOD PRESSURE: 88 MMHG | TEMPERATURE: 97.3 F | BODY MASS INDEX: 30.79 KG/M2 | HEART RATE: 84 BPM | RESPIRATION RATE: 18 BRPM | SYSTOLIC BLOOD PRESSURE: 140 MMHG | WEIGHT: 185 LBS

## 2024-08-13 LAB
ALBUMIN SERPL BCG-MCNC: 4 G/DL (ref 3.5–5.2)
ALP SERPL-CCNC: 86 U/L (ref 40–150)
ALT SERPL W P-5'-P-CCNC: 20 U/L (ref 0–50)
ANION GAP SERPL CALCULATED.3IONS-SCNC: 12 MMOL/L (ref 7–15)
AST SERPL W P-5'-P-CCNC: 17 U/L (ref 0–45)
BILIRUB SERPL-MCNC: 0.4 MG/DL
BUN SERPL-MCNC: 8.7 MG/DL (ref 6–20)
CALCIUM SERPL-MCNC: 8.7 MG/DL (ref 8.8–10.4)
CHLORIDE SERPL-SCNC: 105 MMOL/L (ref 98–107)
CREAT SERPL-MCNC: 0.72 MG/DL (ref 0.51–0.95)
EGFRCR SERPLBLD CKD-EPI 2021: >90 ML/MIN/1.73M2
ERYTHROCYTE [DISTWIDTH] IN BLOOD BY AUTOMATED COUNT: 12 % (ref 10–15)
GLUCOSE BLDC GLUCOMTR-MCNC: 103 MG/DL (ref 70–99)
GLUCOSE SERPL-MCNC: 96 MG/DL (ref 70–99)
HCO3 SERPL-SCNC: 24 MMOL/L (ref 22–29)
HCT VFR BLD AUTO: 34.3 % (ref 35–47)
HGB BLD-MCNC: 11.5 G/DL (ref 11.7–15.7)
MAGNESIUM SERPL-MCNC: 2 MG/DL (ref 1.7–2.3)
MCH RBC QN AUTO: 29.2 PG (ref 26.5–33)
MCHC RBC AUTO-ENTMCNC: 33.5 G/DL (ref 31.5–36.5)
MCV RBC AUTO: 87 FL (ref 78–100)
MRSA DNA SPEC QL NAA+PROBE: NEGATIVE
PHOSPHATE SERPL-MCNC: 4 MG/DL (ref 2.5–4.5)
PLATELET # BLD AUTO: 185 10E3/UL (ref 150–450)
POTASSIUM SERPL-SCNC: 3.6 MMOL/L (ref 3.4–5.3)
PROT SERPL-MCNC: 6.5 G/DL (ref 6.4–8.3)
RBC # BLD AUTO: 3.94 10E6/UL (ref 3.8–5.2)
SA TARGET DNA: NEGATIVE
SODIUM SERPL-SCNC: 141 MMOL/L (ref 135–145)
WBC # BLD AUTO: 5.2 10E3/UL (ref 4–11)

## 2024-08-13 PROCEDURE — 250N000011 HC RX IP 250 OP 636: Performed by: EMERGENCY MEDICINE

## 2024-08-13 PROCEDURE — 250N000013 HC RX MED GY IP 250 OP 250 PS 637: Performed by: INTERNAL MEDICINE

## 2024-08-13 PROCEDURE — 83735 ASSAY OF MAGNESIUM: CPT | Performed by: STUDENT IN AN ORGANIZED HEALTH CARE EDUCATION/TRAINING PROGRAM

## 2024-08-13 PROCEDURE — 99207 PR APP CREDIT; MD BILLING SHARED VISIT: CPT

## 2024-08-13 PROCEDURE — 96366 THER/PROPH/DIAG IV INF ADDON: CPT

## 2024-08-13 PROCEDURE — C1729 CATH, DRAINAGE: HCPCS

## 2024-08-13 PROCEDURE — G0378 HOSPITAL OBSERVATION PER HR: HCPCS

## 2024-08-13 PROCEDURE — 99239 HOSP IP/OBS DSCHRG MGMT >30: CPT | Performed by: INTERNAL MEDICINE

## 2024-08-13 PROCEDURE — 84100 ASSAY OF PHOSPHORUS: CPT | Performed by: STUDENT IN AN ORGANIZED HEALTH CARE EDUCATION/TRAINING PROGRAM

## 2024-08-13 PROCEDURE — 85027 COMPLETE CBC AUTOMATED: CPT | Performed by: STUDENT IN AN ORGANIZED HEALTH CARE EDUCATION/TRAINING PROGRAM

## 2024-08-13 PROCEDURE — 36415 COLL VENOUS BLD VENIPUNCTURE: CPT | Performed by: STUDENT IN AN ORGANIZED HEALTH CARE EDUCATION/TRAINING PROGRAM

## 2024-08-13 PROCEDURE — 80053 COMPREHEN METABOLIC PANEL: CPT | Performed by: STUDENT IN AN ORGANIZED HEALTH CARE EDUCATION/TRAINING PROGRAM

## 2024-08-13 PROCEDURE — 82962 GLUCOSE BLOOD TEST: CPT

## 2024-08-13 PROCEDURE — 96367 TX/PROPH/DG ADDL SEQ IV INF: CPT

## 2024-08-13 PROCEDURE — 99221 1ST HOSP IP/OBS SF/LOW 40: CPT | Mod: FS | Performed by: SURGERY

## 2024-08-13 PROCEDURE — 250N000011 HC RX IP 250 OP 636: Performed by: STUDENT IN AN ORGANIZED HEALTH CARE EDUCATION/TRAINING PROGRAM

## 2024-08-13 PROCEDURE — 258N000003 HC RX IP 258 OP 636: Performed by: EMERGENCY MEDICINE

## 2024-08-13 PROCEDURE — 250N000013 HC RX MED GY IP 250 OP 250 PS 637: Performed by: STUDENT IN AN ORGANIZED HEALTH CARE EDUCATION/TRAINING PROGRAM

## 2024-08-13 PROCEDURE — 87070 CULTURE OTHR SPECIMN AEROBIC: CPT | Performed by: RADIOLOGY

## 2024-08-13 PROCEDURE — 87075 CULTR BACTERIA EXCEPT BLOOD: CPT | Performed by: RADIOLOGY

## 2024-08-13 RX ORDER — SULFAMETHOXAZOLE/TRIMETHOPRIM 800-160 MG
1 TABLET ORAL 2 TIMES DAILY
Status: DISCONTINUED | OUTPATIENT
Start: 2024-08-13 | End: 2024-08-13 | Stop reason: HOSPADM

## 2024-08-13 RX ORDER — NALOXONE HYDROCHLORIDE 0.4 MG/ML
0.2 INJECTION, SOLUTION INTRAMUSCULAR; INTRAVENOUS; SUBCUTANEOUS
Status: DISCONTINUED | OUTPATIENT
Start: 2024-08-13 | End: 2024-08-13 | Stop reason: HOSPADM

## 2024-08-13 RX ORDER — NALOXONE HYDROCHLORIDE 0.4 MG/ML
0.4 INJECTION, SOLUTION INTRAMUSCULAR; INTRAVENOUS; SUBCUTANEOUS
Status: DISCONTINUED | OUTPATIENT
Start: 2024-08-13 | End: 2024-08-13 | Stop reason: HOSPADM

## 2024-08-13 RX ORDER — FERROUS SULFATE 325(65) MG
325 TABLET ORAL
Status: DISCONTINUED | OUTPATIENT
Start: 2024-08-13 | End: 2024-08-13 | Stop reason: HOSPADM

## 2024-08-13 RX ADMIN — LIOTHYRONINE SODIUM 10 MCG: 5 TABLET ORAL at 08:18

## 2024-08-13 RX ADMIN — SULFAMETHOXAZOLE AND TRIMETHOPRIM 1 TABLET: 800; 160 TABLET ORAL at 11:25

## 2024-08-13 RX ADMIN — SODIUM CHLORIDE 1250 MG: 9 INJECTION, SOLUTION INTRAVENOUS at 08:18

## 2024-08-13 RX ADMIN — LEVOTHYROXINE SODIUM 50 MCG: 0.03 TABLET ORAL at 08:18

## 2024-08-13 RX ADMIN — PRENATAL VIT W/ FE FUMARATE-FA TAB 27-0.8 MG 1 TABLET: 27-0.8 TAB at 08:18

## 2024-08-13 RX ADMIN — PIPERACILLIN AND TAZOBACTAM 3.38 G: 3; .375 INJECTION, POWDER, FOR SOLUTION INTRAVENOUS at 01:52

## 2024-08-13 RX ADMIN — LABETALOL HYDROCHLORIDE 100 MG: 100 TABLET, FILM COATED ORAL at 08:17

## 2024-08-13 RX ADMIN — FERROUS SULFATE TAB 325 MG (65 MG ELEMENTAL FE) 325 MG: 325 (65 FE) TAB at 08:53

## 2024-08-13 RX ADMIN — AMOXICILLIN AND CLAVULANATE POTASSIUM 1 TABLET: 875; 125 TABLET, FILM COATED ORAL at 11:25

## 2024-08-13 ASSESSMENT — ACTIVITIES OF DAILY LIVING (ADL)
ADLS_ACUITY_SCORE: 35

## 2024-08-13 NOTE — DISCHARGE SUMMARY
"[facility]  Hospitalist Discharge Summary      Date of Admission:  8/12/2024  Date of Discharge:  8/13/2024  2:03 PM  Discharging Provider: Sariah Anderson DO  Discharge Service: Hospitalist Service    Discharge Diagnoses   Breast abscess    Clinically Significant Risk Factors     # Obesity: Estimated body mass index is 30.79 kg/m  as calculated from the following:    Height as of an earlier encounter on 8/12/24: 1.651 m (5' 5\").    Weight as of this encounter: 83.9 kg (185 lb).       Follow-ups Needed After Discharge   Follow-up Appointments     Follow-up and recommended labs and tests       Follow up with primary care provider, CLAUDIA DAMON, within 3-4 days .      Follow up with Interventional radiology for management of breast drain        General surgery breast team  referral provided.  Discussed with nursing who set up follow-up appointment in 3 days.    Unresulted Labs Ordered in the Past 30 Days of this Admission       Date and Time Order Name Status Description    8/13/2024 10:44 AM Anaerobic culture In process     8/13/2024 10:44 AM Abscess Aerobic Bacterial Culture Routine With Gram Stain In process         These results [place] [facility]    Discharge Disposition   Discharged to home  Condition at discharge: Stable    Hospital Course   Patient is a 39-year-old lactating female who presented with right chest wall pain and lump.  Was seen at [facility] prior and underwent I&D.  Initiated on Augmentin and Bactrim as culture grew out MRSA.  Presenting to [facility] with continued abscess.  Was evaluated by general surgery.  Evaluated by radiology and drain was placed.  Discussed with radiology team who recommended follow-up with breast surgeon for possible further I&D if needed.  Patient discharged home in stable condition.      Consultations This Hospital Stay   PHARMACY TO DOSE [facility]ANCO  INTERVENTIONAL RADIOLOGY ADULT/PEDS IP CONSULT  PHARMACY " TO DOSE VANCO  PHARMACY TO DOSE [facility]ANCO  SURGERY GENERAL IP CONSULT    Code Status   Full Code    Time Spent on this Encounter   I, Sariah Anderson DO, personally saw the patient today and spent greater than 30 minutes discharging this patient.       Sariah Anderson DO  [facility] EMERGENCY DEPARTMENT  [address] [address]  Phone: [phone]  ______________________________________________________________________    Physical Exam   Vital Signs: Temp: 97.3  F (36.3  C) Temp src: Oral BP: (!) 140/88 Pulse: 84   Resp: 18 SpO2: 97 % O2 Device: None (Room air)    Weight: 185 lbs 0 oz         Primary Care Physician   CLAUDIA DAMON    Discharge Orders      Adult Gen Surg  Referral      Reason for your hospital stay    Breast abscess     Follow-up and recommended labs and tests     Follow up with primary care provider, CLAUDIA DAMON, within 3-4 days .    Follow up with Interventional radiology for management of breast drain     Activity    Your activity upon discharge: activity as tolerated     Diet    Follow this diet upon discharge: Regular diet       Significant Results and Procedures   Most Recent 3 CBC's:  Recent Labs   Lab Test 08/13/24  0447 08/12/24  1725 05/11/23  1258   WBC 5.2 5.7  --    HGB 11.5* 13.7 11.1*   MCV 87 86  --     239  --      Most Recent 3 BMP's:  Recent Labs   Lab Test 08/13/24  0752 08/13/24  0447 08/12/24  1725   NA  --  141 141   POTASSIUM  --  3.6 3.9   CHLORIDE  --  105 102   CO2  --  24 23   BUN  --  8.7 10.4   CR  --  0.72 0.73   ANIONGAP  --  12 16*   IGNACIO  --  8.7* 10.0   * 96 102*     Most Recent 2 LFT's:  Recent Labs   Lab Test 08/13/24 0447   AST 17   ALT 20   ALKPHOS 86   BILITOTAL 0.4   ,   Results for orders placed or performed during the hospital encounter of 08/12/24   US Breast Abscess Seroma Drainage Tube Place    Narrative    EXAM:  1. PERCUTANEOUS DRAIN PLACEMENT RIGHT BREAST ABSCESS  2. US  GUIDANCE  LOCATION: [facility]  DATE: 8/13/2024    INDICATION: Right breast abscess    PROCEDURE: Informed consent obtained. Site marked. Prior images reviewed. Required items made available. Patient identity confirmed verbally and with arm band. Universal protocol was followed. Time out performed. The site was prepped and draped in   sterile fashion. 25 mL of 1% lidocaine was infused into the local soft tissues. Using standard technique and under direct ultrasound guidance, a locking 8 Malay drainage catheter was inserted into the abscess in the far medial aspect of the right   breast.      SPECIMEN: 8 mL of purulent fluid was aspirated and sent to lab for cultures and Gram stain.    BLOOD LOSS: Minimal.    The patient tolerated the procedure well. No immediate complications.        Impression    IMPRESSION:  1.  Successful US-guided drain placement into a right breast abscess.  2.  Request that the surgery team manage the drainage catheter and the timing of its removal.    Reference CPT Codes: 30111,   US Breast Right Limited 1-3 Quadrants    Narrative    PRELIMINARY REPORT    EXAM: ULTRASOUND BREAST UNILATERAL RIGHT LIMITED  LOCATION: [facility]  DATE: 8/12/2024    INDICATION: Abscess on the right breast.    COMPARISON: None.    ULTRASOUND FINDINGS: In the right breast, at the 2:00 position, approximately 11 cm from the nipple, there is a complex fluid collection measuring 3.3 x 3.1 x 1.2 cm. There is peripheral vascularity and internal heterogeneously hypoechoic contents.   Increased echogenicity of the adjacent fat.      Impression    IMPRESSION: In the right breast, at the 2:00 position, approximately 11 cm from the nipple, complex 3.3 cm fluid collection, suspicious for abscess.    Preliminary Interpretation Dictated By: Wayne Tavarez MD  Date: 08/12/2024       Discharge Medications   Discharge Medication List as of 8/13/2024  1:32 PM        CONTINUE these  medications which have NOT CHANGED    Details   amoxicillin-clavulanate (AUGMENTIN) 875-125 MG tablet Take 1 tablet by mouth 2 times daily X 10 days. Started 8/5/24 PM, Historical      ferrous gluconate (FERGON) 324 (38 Fe) MG tablet Take 324 mg by mouth daily (with breakfast), Historical      ibuprofen (ADVIL/MOTRIN) 200 MG tablet Take 4 tablets (800 mg) by mouth every 6 hours as needed for other (cramping), OTC      labetalol (NORMODYNE) 100 MG tablet Take 100 mg by mouth 2 times daily, Historical      levothyroxine (SYNTHROID/LEVOTHROID) 50 MCG tablet Take 50 mcg by mouth daily, Historical      liothyronine (CYTOMEL) 5 MCG tablet Take 10 mcg by mouth daily, Historical      Prenatal Vit-Fe Fumarate-FA (PRENATAL MULTIVITAMIN W/IRON) 27-0.8 MG tablet Take 1 tablet by mouth daily, Historical      sulfamethoxazole-trimethoprim (BACTRIM DS) 800-160 MG tablet Take 1 tablet by mouth 2 times daily X 7 days. Started 8/8/24 PM, Historical           Allergies   No Known Allergies

## 2024-08-13 NOTE — H&P
[facility]    History and Physical - Hospitalist Service       Date of Admission:  8/12/2024    Assessment & Plan    Assessment:  Chandni Landin is a 39 year old female admitted on 8/12/2024. She presented to the ER with complaint of painful lesion in the medial right breast. MRSA nasal swab pending, ultrasound of the breast showed In the right breast, at the 2:00 position, approximately 11 cm from the nipple, complex 3.3 cm fluid collection, suspicious for abscess.  ED provider discussed with surgery who recommended IV antibiotics, pain control and possibly drainage, ED provider also spoke to IR for possible drain placement, Patient received initial doses of IV antibiotics and is going to be admitted for  breast abscess    Problem list and plan:  Breast abscess  Patient presented to the ER with complaint of painful lesion in the medial right breast.    She was started on Augmentin and Bactrim without significant improvement  She had it drained at regions and culture results grew MRSA  MRSA nasal swab pending  Ultrasound of the breast showed In the right breast, at the 2:00 position, approximately 11 cm from the nipple, complex 3.3 cm fluid collection, suspicious for abscess.    ED provider discussed with surgery who recommended IV antibiotics, pain control and possibly drainage,   ED provider also spoke to IR for possible drain placement  Patient received initial doses of IV antibiotics and is going to be admitted for  breast abscess  Continue with pain management as per protocol  Holding home Augmentin and Bactrim for now, may need to be started on a different antibiotic on discharge    Mild hyperglycemia most likely reactive  Follow-up hemoglobin A1c  Monitor blood sugar level intermittently    History of hypertension  Continue with labetalol  IV hydralazine as needed for elevated blood pressure  Monitor vital signs as per protocol    History of hypothyroidism  Continue  "levothyroxine  Continue liothyronine  TSH and T4 ordered, follow-up results    DVT PPx  Intermittent pneumatic compression    CODE STATUS  Full code as per discussion with the patient          Diet: Combination Diet Regular Diet Adult  NPO per Anesthesia Guidelines for Procedure/Surgery Except for: Meds    DVT Prophylaxis: Pneumatic Compression Devices  Marie Catheter: Not present  Lines: None     Cardiac Monitoring: None  Code Status: Full Code  Mental status: Patient was alert awake and oriented x 3, patient with a good story and most of the history was obtained from the patient, some history was obtained from chart review and the rest of the history was obtained from discussion with the ER physician  Clinically Significant Risk Factors Present on Admission                  # Hypertension: Noted on problem list         # Obesity: Estimated body mass index is 30.79 kg/m  as calculated from the following:    Height as of an earlier encounter on 8/12/24: 1.651 m (5' 5\").    Weight as of this encounter: 83.9 kg (185 lb).                    Disposition Plan     Medically Ready for Discharge: Anticipated in 2-4 Days     Saad J. Gondal, MD  Hospitalist Service  [facility]  Securely message with [employer]Water (more info)  Text page via [employer] Paging/Directory     ______________________________________________________________________    Chief Complaint   Painful lesion in the medial right breast    History is obtained from the patient    History of Present Illness   Chandni Landin is a 39 year old female with a past medical history documented below presented to the ER with complaint of painful lesion in the medial right breast.  As per patient this all started around 2 weeks ago, initially she thought the lesion is a bug bite but later the lesion worsened and became painful, she stated that she went to her PCP last week who recommended drainage and started her on Augmentin, she tried to schedule at " [facility] but was told to go to the ER so she, she stated that the pain remained until yesterday went to [facility] where the lesion was drained and she was also prescribed Bactrim, she also mentioned that she was told that the culture was growing MRSA, she stated that today she became feverish and disoriented and decided to come to the ER, in the ER vitals were significant for mildly elevated blood pressure otherwise vitally stable, labs significant for mild hyperglycemia, MRSA nasal swab pending, ultrasound of the breast showed In the right breast, at the 2:00 position, approximately 11 cm from the nipple, complex 3.3 cm fluid collection, suspicious for abscess.  ED provider discussed with surgery who recommended IV antibiotics, pain control and possibly drainage, ED provider also spoke to IR for possible drain placement, Patient received initial doses of IV antibiotics and is going to be admitted for  breast abscess      Past Medical History    Past Medical History:   Diagnosis Date    Acne     Contraceptive management     Depressive disorder, not elsewhere classified 2003    TX'd with Prozac, resolved    Hyperhydrosis disorder     Left axilla & palm,  Robinul-Forte 2mg    Premenstrual syndrome (PMS)        Past Surgical History   Past Surgical History:   Procedure Laterality Date    DILATION AND CURETTAGE N/A 12/15/2014    Procedure: SUCTION CURETTAGE ;  Surgeon: Maulik Greenwood MD;  [place] [facility] [place]:     TONSILLECTOMY  1997    TONSILLECTOMY  1995    WISDOM TOOTH EXTRACTION  2005       Prior to Admission Medications   Prior to Admission Medications   Prescriptions Last Dose Informant Patient Reported? Taking?   Prenatal Vit-Fe Fumarate-FA (PRENATAL MULTIVITAMIN W/IRON) 27-0.8 MG tablet Past Week at unknown  Yes Yes   Sig: Take 1 tablet by mouth daily   amoxicillin-clavulanate (AUGMENTIN) 875-125 MG tablet 8/12/2024 at am  Yes Yes   Sig: Take 1 tablet by mouth 2 times  daily X 10 days. Started 8/5/24 PM   ferrous gluconate (FERGON) 324 (38 Fe) MG tablet Past Month at unknown  Yes Yes   Sig: Take 324 mg by mouth daily (with breakfast)   ibuprofen (ADVIL/MOTRIN) 200 MG tablet 8/12/2024 at am  No Yes   Sig: Take 4 tablets (800 mg) by mouth every 6 hours as needed for other (cramping)   labetalol (NORMODYNE) 100 MG tablet 8/12/2024 at am  Yes Yes   Sig: Take 100 mg by mouth 2 times daily   levothyroxine (SYNTHROID/LEVOTHROID) 50 MCG tablet 8/12/2024 at am  Yes Yes   Sig: Take 50 mcg by mouth daily   liothyronine (CYTOMEL) 5 MCG tablet 8/12/2024 at am  Yes Yes   Sig: Take 10 mcg by mouth daily   sulfamethoxazole-trimethoprim (BACTRIM DS) 800-160 MG tablet 8/12/2024 at am  Yes Yes   Sig: Take 1 tablet by mouth 2 times daily X 7 days. Started 8/8/24 PM      Facility-Administered Medications: None        Review of Systems    The 10 point Review of Systems is negative other than noted in the HPI or here.  Painful lesion in the medial right breast    Physical Exam   Vital Signs: Temp: 98.5  F (36.9  C) Temp src: Oral BP: (!) 144/101 Pulse: 88   Resp: 16 SpO2: 98 %      Weight: 185 lbs 0 oz    GENERAL: Patient was seen and examined at bedside with no acute distress  HENT:  Head is normocephalic, atraumatic.   EYES:  Eyes have anicteric sclerae without conjunctival injection   LUNGS: Bilateral air entry, clear to auscultation bilaterally  CARDIOVASCULAR:  Regular rate and rhythm with no murmurs, gallops or rubs.  ABDOMEN:  Normal bowel sounds. Soft; nontender   EXT: no edema    SKIN:  No acute rashes.  Redness, warmth, tenderness to palpation, swelling of the medial right breast  NEUROLOGIC:  Grossly nonfocal.      Medical Decision Making       80 MINUTES SPENT BY ME on the date of service doing chart review, history, exam, documentation & further activities per the note.      Data     I have personally reviewed the following data over the past 24 hrs:    5.7  \   13.7   / 239     141 102  10.4 /  102 (H)   3.9 23 0.73 \       Imaging results reviewed over the past 24 hrs:   Recent Results (from the past 24 hour(s))   US Breast Bilateral Limited 1-3 Quadrants    Narrative    PRELIMINARY REPORT    EXAM: ULTRASOUND BREAST UNILATERAL RIGHT LIMITED  LOCATION: [facility]  DATE: 8/12/2024    INDICATION: Abscess on the right breast.    COMPARISON: None.    ULTRASOUND FINDINGS: In the right breast, at the 2:00 position, approximately 11 cm from the nipple, there is a complex fluid collection measuring 3.3 x 3.1 x 1.2 cm. There is peripheral vascularity and internal heterogeneously hypoechoic contents.   Increased echogenicity of the adjacent fat.      Impression    IMPRESSION: In the right breast, at the 2:00 position, approximately 11 cm from the nipple, complex 3.3 cm fluid collection, suspicious for abscess.    Preliminary Interpretation Dictated By: Wayne Tavarez MD  Date: 08/12/2024

## 2024-08-13 NOTE — PROGRESS NOTES
Care Management Discharge Note    Discharge Date: 08/13/2024       Discharge Disposition:  Home    Discharge Services:  none      Additional Information:  Pt discharging home.    CRISTINA Morrow

## 2024-08-13 NOTE — CONSULTS
"General Surgery Consultation  Chandni Landin MRN# [id number]   Age/Sex: 39 year old female YOB: 1985     Reason for consult: 1. Abscess            Requesting physician: Gondal, Saad J, MD                    Assessment and Plan:   Assessment:  Chandni Landin is a 39 year old female with hx of depression with recent R breast aspiration at [facility] on 08/08 as well as recent abx therapy of Augmentin (Rx by PCP) and Bactrim who presents d/t persistent/worsening R breast pain x 2 weeks.  Labs with leukocytosis but with slight anemia (Hgb 11.5) with baseline of 11.1 1 year prior. Breast US reveals fluid collection measuring 3.3 cm that is concerning for abscess that is non-subareolar 11 cm from the nipple. On exam, 4 x 4 cm right breast erythematous fluctuant mass c/f breast abscess. IR consulted for R breast aspiration vs drain placement.    Plan:  -IR consulted for R breast aspiration vs drain placement who is now s/p US guided drain placement with 10 ml of purulent fluid obtained     -Cx obtained and in process  -Continue abx per primary  -Continue mgmt per IR and primary team  -No further surgical intervention indicated  -Surgery to sign off at this time          Chief Complaint:     Chief Complaint   Patient presents with    Breast Pain        History is obtained from the patient    HPI:   Chandni Landin is a 39 year old female with past medical history of depression, and surgical history of tonsillectomy with recent outside hospital right breast aspiration ([facility]) d/t R breast abscess discharged with Bactrim and is currently presenting due to persistent and worsening right breast pain.  She reports prior to ED presentation on August 8 had been seen by her primary care provider who had prescribed Augmentin.  States symptom onset 2 weeks ago with concern of a \"bug bite\" worsening redness and pain.  States spontaneous slight relief 2 days ago with new onset fever chills like " "sensation.  Denies any current drainage from right breast lesion however does report during aspiration and had looked \"yellow\".  She reports she is currently breast-feeding with her child being 15 months old, stating that she is pumping and dumping on her right breast but then feeding her infant child on her left.  Denies current anticoagulation or antiplatelet use.  Denies, nausea, vomiting, further localization of pain, diarrhea, constipation.          Past Medical History:     Past Medical History:   Diagnosis Date    Acne     Contraceptive management     Depressive disorder, not elsewhere classified 2003    TX'd with Prozac, resolved    Hyperhydrosis disorder     Left axilla & palm,  Robinul-Forte 2mg    Premenstrual syndrome (PMS)               Past Surgical History:     Past Surgical History:   Procedure Laterality Date    DILATION AND CURETTAGE N/A 12/15/2014    Procedure: SUCTION CURETTAGE ;  Surgeon: Maulik Greenwood MD;  Location: [facility];  Service:     TONSILLECTOMY  1997    TONSILLECTOMY  1995    WISDOM TOOTH EXTRACTION  2005             Social History:    reports that she has never smoked. She has never used smokeless tobacco. She reports that she does not drink alcohol and does not use drugs.           Family History:     Family History   Problem Relation Age of Onset    Prostate Cancer Paternal Grandfather     Cerebrovascular Disease Paternal Grandfather     Heart Disease Paternal Grandfather         MI    Hypertension Mother     Respiratory Sister         asthma    Thyroid Disease Mother     Lipids Father               Allergies:   No Known Allergies           Medications:     Prior to Admission medications    Medication Sig Start Date End Date Taking? Authorizing Provider   amoxicillin-clavulanate (AUGMENTIN) 875-125 MG tablet Take 1 tablet by mouth 2 times daily X 10 days. Started 8/5/24 PM 8/5/24  Yes Reported, Patient   ferrous gluconate (FERGON) 324 (38 Fe) MG tablet Take 324 " mg by mouth daily (with breakfast)   Yes Reported, Patient   ibuprofen (ADVIL/MOTRIN) 200 MG tablet Take 4 tablets (800 mg) by mouth every 6 hours as needed for other (cramping) 5/12/23  Yes Diamante Muro MD   labetalol (NORMODYNE) 100 MG tablet Take 100 mg by mouth 2 times daily   Yes Reported, Patient   levothyroxine (SYNTHROID/LEVOTHROID) 50 MCG tablet Take 50 mcg by mouth daily   Yes Reported, Patient   liothyronine (CYTOMEL) 5 MCG tablet Take 10 mcg by mouth daily   Yes Reported, Patient   Prenatal Vit-Fe Fumarate-FA (PRENATAL MULTIVITAMIN W/IRON) 27-0.8 MG tablet Take 1 tablet by mouth daily   Yes Reported, Patient   sulfamethoxazole-trimethoprim (BACTRIM DS) 800-160 MG tablet Take 1 tablet by mouth 2 times daily X 7 days. Started 8/8/24 PM 8/8/24 8/15/24 Yes Reported, Patient              Review of Systems:   The Review of Systems is negative other than noted in the HPI            Physical Exam:   Patient Vitals for the past 24 hrs:   BP Temp Temp src Pulse Resp SpO2 Weight   08/13/24 0817 (!) 140/88 97.3  F (36.3  C) Oral 84 18 97 % --   08/13/24 0800 (!) 140/88 97.9  F (36.6  C) Oral 84 22 99 % --   08/12/24 2315 110/71 -- -- 78 -- 98 % --   08/12/24 1731 (!) 144/101 98.5  F (36.9  C) Oral 88 16 98 % 83.9 kg (185 lb)   08/12/24 1613 -- -- -- -- -- -- 86.2 kg (190 lb)          Intake/Output Summary (Last 24 hours) at 8/13/2024 1047  Last data filed at 8/13/2024 1000  Gross per 24 hour   Intake 350 ml   Output --   Net 350 ml      Constitutional:   awake, alert, cooperative, no apparent distress, and appears stated age       Eyes:   PERRL, conjunctiva/corneas clear, EOM's intact; no scleral edema or icterus noted        ENT:   Normocephalic, without obvious abnormality, atraumatic, Lips, mucosa, and tongue normal      Lungs:   Normal respiratory effort, no accessory muscle use       Cardiovascular:   Regular rate and rhythm           Musculoskeletal:   No obvious swelling, bruising or deformity        Skin:   4 x 4 centimeter elevated right breast lesion that is erythematous and warm to touch with central fluctuance localized approximately 8 cm from right nipple in the 2 o'clock position of the right breast.              Data:         All imaging studies reviewed by me.    Results for orders placed or performed during the hospital encounter of 08/12/24 (from the past 24 hour(s))   CBC with platelets + differential    Narrative    The following orders were created for panel order CBC with platelets + differential.  Procedure                               Abnormality         Status                     ---------                               -----------         ------                     CBC with platelets and d...[[id number]]                      Final result                 Please view results for these tests on the individual orders.   Basic metabolic panel   Result Value Ref Range    Sodium 141 135 - 145 mmol/L    Potassium 3.9 3.4 - 5.3 mmol/L    Chloride 102 98 - 107 mmol/L    Carbon Dioxide (CO2) 23 22 - 29 mmol/L    Anion Gap 16 (H) 7 - 15 mmol/L    Urea Nitrogen 10.4 6.0 - 20.0 mg/dL    Creatinine 0.73 0.51 - 0.95 mg/dL    GFR Estimate >90 >60 mL/min/1.73m2    Calcium 10.0 8.8 - 10.4 mg/dL    Glucose 102 (H) 70 - 99 mg/dL   HCG quantitative pregnancy   Result Value Ref Range    hCG Quantitative <1 <5 mIU/mL   CBC with platelets and differential   Result Value Ref Range    WBC Count 5.7 4.0 - 11.0 10e3/uL    RBC Count 4.62 3.80 - 5.20 10e6/uL    Hemoglobin 13.7 11.7 - 15.7 g/dL    Hematocrit 39.7 35.0 - 47.0 %    MCV 86 78 - 100 fL    MCH 29.7 26.5 - 33.0 pg    MCHC 34.5 31.5 - 36.5 g/dL    RDW 12.3 10.0 - 15.0 %    Platelet Count 239 150 - 450 10e3/uL    % Neutrophils 65 %    % Lymphocytes 19 %    % Monocytes 10 %    % Eosinophils 6 %    % Basophils 1 %    % Immature Granulocytes 0 %    NRBCs per 100 WBC 0 <1 /100    Absolute Neutrophils 3.7 1.6 - 8.3 10e3/uL    Absolute Lymphocytes 1.1 0.8 - 5.3  10e3/uL    Absolute Monocytes 0.5 0.0 - 1.3 10e3/uL    Absolute Eosinophils 0.3 0.0 - 0.7 10e3/uL    Absolute Basophils 0.0 0.0 - 0.2 10e3/uL    Absolute Immature Granulocytes 0.0 <=0.4 10e3/uL    Absolute NRBCs 0.0 10e3/uL   Magnesium   Result Value Ref Range    Magnesium 2.2 1.7 - 2.3 mg/dL   Phosphorus   Result Value Ref Range    Phosphorus 4.5 2.5 - 4.5 mg/dL   Hemoglobin A1c   Result Value Ref Range    Hemoglobin A1C 4.8 <5.7 %   TSH with free T4 reflex   Result Value Ref Range    TSH 0.74 0.30 - 4.20 uIU/mL   US Breast Right Limited 1-3 Quadrants    Narrative    PRELIMINARY REPORT    EXAM: ULTRASOUND BREAST UNILATERAL RIGHT LIMITED  LOCATION: [facility]  DATE: 8/12/2024    INDICATION: Abscess on the right breast.    COMPARISON: None.    ULTRASOUND FINDINGS: In the right breast, at the 2:00 position, approximately 11 cm from the nipple, there is a complex fluid collection measuring 3.3 x 3.1 x 1.2 cm. There is peripheral vascularity and internal heterogeneously hypoechoic contents.   Increased echogenicity of the adjacent fat.      Impression    IMPRESSION: In the right breast, at the 2:00 position, approximately 11 cm from the nipple, complex 3.3 cm fluid collection, suspicious for abscess.    Preliminary Interpretation Dictated By: aWyne Tavarez MD  Date: 08/12/2024   MRSA MSSA PCR, Nasal Swab    Specimen: Nares, Bilateral; Swab   Result Value Ref Range    MRSA Target DNA Negative Negative    SA Target DNA Negative     Narrative    [employer]  Xpert SA Nasal Complete assay performed in the [employer]  Dx System is a qualitative in vitro diagnostic test designed for rapid detection of Staphylococcus aureus (SA) and methicillin-resistant Staphylococcus aureus (MRSA) from nasal swabs in patients at risk for nasal colonization. The test utilizes automated real-time polymerase chain reaction (PCR) to detect MRSA/SA DNA. The Xpert SA Nasal Complete assay is intended to aid in the prevention  and control of MRSA/SA infections in healthcare settings. The assay is not intended to diagnose, guide or monitor treatment for MRSA/SA infections, or provide results of susceptibility to methicillin. A negative result does not preclude MRSA/SA nasal colonization.    Comprehensive metabolic panel   Result Value Ref Range    Sodium 141 135 - 145 mmol/L    Potassium 3.6 3.4 - 5.3 mmol/L    Carbon Dioxide (CO2) 24 22 - 29 mmol/L    Anion Gap 12 7 - 15 mmol/L    Urea Nitrogen 8.7 6.0 - 20.0 mg/dL    Creatinine 0.72 0.51 - 0.95 mg/dL    GFR Estimate >90 >60 mL/min/1.73m2    Calcium 8.7 (L) 8.8 - 10.4 mg/dL    Chloride 105 98 - 107 mmol/L    Glucose 96 70 - 99 mg/dL    Alkaline Phosphatase 86 40 - 150 U/L    AST 17 0 - 45 U/L    ALT 20 0 - 50 U/L    Protein Total 6.5 6.4 - 8.3 g/dL    Albumin 4.0 3.5 - 5.2 g/dL    Bilirubin Total 0.4 <=1.2 mg/dL   CBC with platelets   Result Value Ref Range    WBC Count 5.2 4.0 - 11.0 10e3/uL    RBC Count 3.94 3.80 - 5.20 10e6/uL    Hemoglobin 11.5 (L) 11.7 - 15.7 g/dL    Hematocrit 34.3 (L) 35.0 - 47.0 %    MCV 87 78 - 100 fL    MCH 29.2 26.5 - 33.0 pg    MCHC 33.5 31.5 - 36.5 g/dL    RDW 12.0 10.0 - 15.0 %    Platelet Count 185 150 - 450 10e3/uL   Phosphorus   Result Value Ref Range    Phosphorus 4.0 2.5 - 4.5 mg/dL   Magnesium   Result Value Ref Range    Magnesium 2.0 1.7 - 2.3 mg/dL   Glucose by meter   Result Value Ref Range    GLUCOSE BY METER POCT 103 (H) 70 - 99 mg/dL   US guided drain placement right breast abscess    Narrative    Juan Wylie MD     8/13/2024 10:32 AM  [facility]    Procedure: US guided drain placement right breast abscess    Date/Time: 8/13/2024 10:30 AM    Performed by: Juan Wylie MD  Authorized by: Juan Wylie MD      UNIVERSAL PROTOCOL   Site Marked: Yes  Prior Images Obtained and Reviewed:  Yes  Required items: Required blood products, implants, devices and special   equipment available    Patient identity confirmed:  Verbally  with patient, arm band, provided   demographic data and hospital-assigned identification number  Patient was reevaluated immediately before administering moderate or deep   sedation or anesthesia  Confirmation Checklist:  Patient's identity using two indicators, relevant   allergies, procedure was appropriate and matched the consent or emergent   situation and correct equipment/implants were available  Time out: Immediately prior to the procedure a time out was called    Universal Protocol: the Joint Commission Universal Protocol was followed    Preparation: Patient was prepped and draped in usual sterile fashion    ESBL (mL):  2     ANESTHESIA    Anesthesia:  Local infiltration  Local Anesthetic:  Lidocaine 1% without epinephrine  Anesthetic Total (mL):  25    See dictated procedure note for full details.  Findings: Purulent fluid within the right breast abscess sent to the lab.   A locking 8 ft drain placed.    Specimens: none and fluid and/or tissue for gram stain and culture    Complications: None    Condition: Stable    Plan: Return to floor bed. Request that surgery manage the drain.      PROCEDURE  Describe Procedure: US guided locking 8 fr drain placement in a right   breast abscess yielding 10 ml purulent fluid that was sent to the lab.  Patient Tolerance:  Patient tolerated the procedure well with no immediate   complications  Length of time physician/provider present for 1:1 monitoring during   sedation: 0           Sandra Stewart PA-C  [facility] General Surgery  [address]  [address] [place] [id number] [phone]

## 2024-08-13 NOTE — PROCEDURES
[facility]    Procedure: US guided drain placement right breast abscess    Date/Time: 8/13/2024 10:30 AM    Performed by: Juan Wylie MD  Authorized by: Juan Wylie MD      UNIVERSAL PROTOCOL   Site Marked: Yes  Prior Images Obtained and Reviewed:  Yes  Required items: Required blood products, implants, devices and special equipment available    Patient identity confirmed:  Verbally with patient, arm band, provided demographic data and hospital-assigned identification number  Patient was reevaluated immediately before administering moderate or deep sedation or anesthesia  Confirmation Checklist:  Patient's identity using two indicators, relevant allergies, procedure was appropriate and matched the consent or emergent situation and correct equipment/implants were available  Time out: Immediately prior to the procedure a time out was called    Universal Protocol: the Joint Commission Universal Protocol was followed    Preparation: Patient was prepped and draped in usual sterile fashion    ESBL (mL):  2     ANESTHESIA    Anesthesia:  Local infiltration  Local Anesthetic:  Lidocaine 1% without epinephrine  Anesthetic Total (mL):  25    See dictated procedure note for full details.  Findings: Purulent fluid within the right breast abscess sent to the lab. A locking 8 ft drain placed.    Specimens: none and fluid and/or tissue for gram stain and culture    Complications: None    Condition: Stable    Plan: Return to floor bed. Request that surgery manage the drain.      PROCEDURE  Describe Procedure: US guided locking 8 fr drain placement in a right breast abscess yielding 10 ml purulent fluid that was sent to the lab.  Patient Tolerance:  Patient tolerated the procedure well with no immediate complications  Length of time physician/provider present for 1:1 monitoring during sedation: 0

## 2024-08-13 NOTE — PROGRESS NOTES
Patient A&Ox4, makes needs known. Vitally stable on room air. Redness on upper right breast. Tender to touch. To IR in AM for ALAINA drain placement. Patient tolerated. Patient stable enough to discharge. Educated patient on ways to keep ALAINA drain intact, clean and how to change the dressings. Provided drain sponges and tape to patient. Patient questions answered to satisfactory level. No medications sent. Patient tolerated discharge. Mother provided transportation.

## 2024-08-13 NOTE — PLAN OF CARE
Problem: Pain Acute  Goal: Optimal Pain Control and Function  Outcome: Progressing  Intervention: Develop Pain Management Plan  Recent Flowsheet Documentation  Taken 8/12/2024 2000 by Marizol Resendiz RN  Pain Management Interventions: MD notified (comment)     Problem: Infection  Goal: Absence of Infection Signs and Symptoms  Outcome: Progressing   Goal Outcome Evaluation:       VSS. Pain controlled with prn tylenol.  Patient requested breast pump and one was provided. Alert and oriented and able to make needs known

## 2024-08-13 NOTE — PHARMACY-VANCOMYCIN DOSING SERVICE
Pharmacy Vancomycin Initial Note  Date of Service 2024  Patient's  1985  39 year old, female    Indication: Skin and Soft Tissue Infection    Current estimated CrCl = Estimated Creatinine Clearance: 110.7 mL/min (based on SCr of 0.73 mg/dL).    Creatinine for last 3 days  2024:  5:25 PM Creatinine 0.73 mg/dL    Recent Vancomycin Level(s) for last 3 days  No results found for requested labs within last 3 days.      Vancomycin IV Administrations (past 72 hours)                     vancomycin (VANCOCIN) 1,750 mg in sodium chloride 0.9 % 500 mL intermittent infusion (mg) 1,750 mg Given 24 195                    Nephrotoxins and other renal medications (From now, onward)      Start     Dose/Rate Route Frequency Ordered Stop    24 0800  vancomycin (VANCOCIN) 1,250 mg in sodium chloride 0.9 % 250 mL intermittent infusion         1,250 mg  over 90 Minutes Intravenous EVERY 12 HOURS 24 0100  piperacillin-tazobactam (ZOSYN) 3.375 g vial to attach to  mL bag         3.375 g  over 240 Minutes Intravenous EVERY 8 HOURS 24 1900  vancomycin (VANCOCIN) 1,750 mg in sodium chloride 0.9 % 500 mL intermittent infusion         1,750 mg  over 2 Hours Intravenous ONCE 24 1836              Contrast Orders - past 72 hours (72h ago, onward)      None            [employer] Prediction of Planned Initial Vancomycin Regimen  Regimen: 1250 mg IV every 12 hours.  Start time: 07:56 on 2024  Exposure target: AUC24 (range)400-600 mg/L.hr   AUC24,ss: 497 mg/L.hr  Probability of AUC24 > 400: 72 %  Ctrough,ss: 14.9 mg/L  Probability of Ctrough,ss > 20: 28 %  Probability of nephrotoxicity (Lodise AMY ): 10 %        Plan:  Start vancomycin 1250 mg IV q12h.   Vancomycin monitoring method: AUC  Vancomycin therapeutic monitoring goal: 400-600 mg*h/L  Pharmacy will check vancomycin levels as appropriate in 1-3 Days.    Serum creatinine levels will be  ordered daily for the first week of therapy and at least twice weekly for subsequent weeks.      JASMIN CISNEROS, [facility]

## 2024-08-13 NOTE — CONSULTS
BRIEF IR CONSULT NOTE    IR referral requesting percutaneous drainage of a complex fluid collection medial right breast suspicious for abscess in a 39-year-old woman. Patient reports antibiotics and incision and drainage within the last week [facility] (records not available for review) for the same problem which has not resolved. Yesterday's 8/12/2023 right breast ultrasound reviewed which shows a presumptive abscess that should be amenable to percutaneous aspiration and/or drain placement. This will be arranged.

## 2024-08-14 ENCOUNTER — PATIENT OUTREACH (OUTPATIENT)
Dept: CARE COORDINATION | Facility: CLINIC | Age: 39
End: 2024-08-14
Payer: COMMERCIAL

## 2024-08-14 NOTE — PROGRESS NOTES
[facility]: Transitions of Care Outreach  Chief Complaint   Patient presents with    Clinic Care Coordination - Post Hospital       Most Recent Admission Date: 8/12/2024   Most Recent Admission Diagnosis:      Most Recent Discharge Date: 8/13/2024   Most Recent Discharge Diagnosis: Abscess - L02.91     Transitions of Care Assessment    Discharge Assessment  How are you doing now that you are home?: I'm doing fine thank you so much for checking in  How are your symptoms? (Red Flag symptoms escalate to triage hotline per guidelines): Improved  Do you know how to contact your clinic care team if you have future questions or changes to your health status? : Yes  Does the patient have their discharge instructions? : Yes  Does the patient have questions regarding their discharge instructions? : No  Were you started on any new medications or were there changes to any of your previous medications? : Yes  Does the patient have all of their medications?: Yes  Do you have questions regarding any of your medications? : No  Do you have all of your needed medical supplies or equipment (DME)?  (i.e. oxygen tank, CPAP, cane, etc.): Yes              [facility] Explained and offered Care Coordination support to eligible patients: No    Patient accepted? No    Follow up Plan     Discharge Follow-Up  Discharge follow up appointment scheduled in alignment with recommended follow up timeframe or Transitions of Risk Category? (Low = within 30 days; Moderate= within 14 days; High= within 7 days): Yes  Discharge Follow Up Appointment Date: 08/15/24  Discharge Follow Up Appointment Scheduled with?: Specialty Care Provider    Future Appointments   Date Time Provider Department Center   8/15/2024 11:00 AM Edwin Langston MD [facility]       Outpatient Plan as outlined on AVS reviewed with patient.    For any urgent concerns, please contact our 24 hour nurse triage line: [phone] ([phone])       Vania  Bob  [facility] Health Worker  [facility], [facility]  [phone]

## 2024-08-14 NOTE — PROGRESS NOTES
NEW SURGICAL CONSULTATION  Aug 15, 2024    Chandni Landin is a 39 year old woman who presents with a right  breast complaint.      Pertinent oncologic history:    Diagnostic Ultrasound (8/12/24) showed:    ULTRASOUND FINDINGS: In the right breast, at the 2:00 position, approximately 11 cm from the nipple, there is a complex fluid collection measuring 3.3 x 3.1 x 1.2 cm. There is peripheral vascularity and internal heterogeneously hypoechoic contents.   Increased echogenicity of the adjacent fat.                                                                      IMPRESSION: In the right breast, at the 2:00 position, approximately 11 cm from the nipple, complex 3.3 cm fluid collection, suspicious for abscess.    Culture Culture in progress      3+ Staphylococcus aureus Abnormal                Gram Stain Result  Abnormal   3+ Gram positive cocci in clusters      4+ WBC seen             HPI:    She noted erythema and pain of her upper inner breast last week.  She underwent aspiration at [facility] and was placed on antibiotics.  This started to improve, however then worsened prompting presentation to N.  A drain was placed for some reason.  Since that time things have improved significantly.  Drain has put out minimal serosanguinous output the last few days.  She notes no masses in either breast, axilla, or neck. She denies any nipple inversion.  She is currently breast feeding.    BREAST-SPECIFIC HISTORY:  Prior breast surgeries: No  Prior radiation history: No  Menopause: Pre    Family History   Problem Relation Age of Onset    Prostate Cancer Paternal Grandfather     Cerebrovascular Disease Paternal Grandfather     Heart Disease Paternal Grandfather         MI    Hypertension Mother     Respiratory Sister         asthma    Thyroid Disease Mother     Lipids Father        Patient Active Problem List   Diagnosis    CARDIOVASCULAR SCREENING; LDL GOAL LESS THAN 160    HTN (hypertension)    Normal delivery    Chronic  hypertension affecting pregnancy    Abscess        Past Medical History:   Diagnosis Date    Acne     Contraceptive management     Depressive disorder, not elsewhere classified 2003    TX'd with Prozac, resolved    Hyperhydrosis disorder     Left axilla & palm,  Robinul-Forte 2mg    Premenstrual syndrome (PMS)        Past Surgical History:   Procedure Laterality Date    DILATION AND CURETTAGE N/A 12/15/2014    Procedure: SUCTION CURETTAGE ;  Surgeon: Maulik Greenwood MD;  Location: [facility];  Service:     TONSILLECTOMY  1997    TONSILLECTOMY  1995    WISDOM TOOTH EXTRACTION  2005       Current Outpatient Medications   Medication Sig Dispense Refill    amoxicillin-clavulanate (AUGMENTIN) 875-125 MG tablet Take 1 tablet by mouth 2 times daily X 10 days. Started 8/5/24 PM      ferrous gluconate (FERGON) 324 (38 Fe) MG tablet Take 324 mg by mouth daily (with breakfast) (Patient not taking: Reported on 8/15/2024)      ibuprofen (ADVIL/MOTRIN) 200 MG tablet Take 4 tablets (800 mg) by mouth every 6 hours as needed for other (cramping)      labetalol (NORMODYNE) 100 MG tablet Take 100 mg by mouth 2 times daily      levothyroxine (SYNTHROID/LEVOTHROID) 50 MCG tablet Take 50 mcg by mouth daily      liothyronine (CYTOMEL) 5 MCG tablet Take 10 mcg by mouth daily      Prenatal Vit-Fe Fumarate-FA (PRENATAL MULTIVITAMIN W/IRON) 27-0.8 MG tablet Take 1 tablet by mouth daily          No Known Allergies     SOCIAL HISTORY:  Smokes: No  Here alone    ROS:  Back pain: No  Headache: No  Abdominal pain: No  Unexpected weight loss: No  Easy bruising/bleeding: No  History of DVT/PE: No    Breastfeeding Yes    Physical Exam   General: Awake, alert, NAD  Head/neck: NCAT, no cervical lymphadenopathy  Breast: Breasts symmetric, bilateral nipples everted, right breast with erythema of inner upper quadrant, drain with serosanguinous output, no mass, left breast with no masses or skin changes, no axillary  lymphadenopathy    ASSESSMENT:    Today we discussed typical treatment of breast abscess in the lactating woman.  Typically I would trial at least a few aspirations prior to operative treatment.  Things seem to be improving quite a bit.  With minimal output, I removed the drain in clinic today.  Will await final culture sensitivities.  Will plan to call us again next week with an update.  If continuing to improve will order imaging.  If worsening or not resolved will see her back sooner.    Edwin Langston MD

## 2024-08-15 ENCOUNTER — OFFICE VISIT (OUTPATIENT)
Dept: SURGERY | Facility: CLINIC | Age: 39
End: 2024-08-15
Attending: INTERNAL MEDICINE
Payer: COMMERCIAL

## 2024-08-15 DIAGNOSIS — L02.213 CHEST WALL ABSCESS: ICD-10-CM

## 2024-08-15 DIAGNOSIS — L02.91 ABSCESS: ICD-10-CM

## 2024-08-15 PROCEDURE — 99213 OFFICE O/P EST LOW 20 MIN: CPT | Performed by: SURGERY

## 2024-08-15 NOTE — NURSING NOTE
Chandni presents to [facility] today for a surgical consult with Dr. Langston  regarding a right breast abscess.  She is breastfeeding her 15 mo old child. She was seen in the ER and place on abx and sent to IR for drain placement. She has had minimal out of her drain since.  RN assessment and EMR update.  Patient met with Dr. Langston .  See dictation for details of visit and follow up plan.

## 2024-08-15 NOTE — LETTER
8/15/2024      Chandni Landin  [address]  [place] [facility] [address]      Dear Colleague,    Thank you for referring your patient, Chandni Landin, to the [facility] [place]. Please see a copy of my visit note below.    NEW SURGICAL CONSULTATION  Aug 15, 2024    Chandni Landin is a 39 year old woman who presents with a right  breast complaint.      Pertinent oncologic history:    Diagnostic Ultrasound (8/12/24) showed:    ULTRASOUND FINDINGS: In the right breast, at the 2:00 position, approximately 11 cm from the nipple, there is a complex fluid collection measuring 3.3 x 3.1 x 1.2 cm. There is peripheral vascularity and internal heterogeneously hypoechoic contents.   Increased echogenicity of the adjacent fat.                                                                      IMPRESSION: In the right breast, at the 2:00 position, approximately 11 cm from the nipple, complex 3.3 cm fluid collection, suspicious for abscess.    Culture Culture in progress      3+ Staphylococcus aureus Abnormal                Gram Stain Result  Abnormal   3+ Gram positive cocci in clusters      4+ WBC seen             HPI:    She noted erythema and pain of her upper inner breast last week.  She underwent aspiration at [facility] and was placed on antibiotics.  This started to improve, however then worsened prompting presentation to [facility].  A drain was placed for some reason.  Since that time things have improved significantly.  Drain has put out minimal serosanguinous output the last few days.  She notes no masses in either breast, axilla, or neck. She denies any nipple inversion.  She is currently breast feeding.    BREAST-SPECIFIC HISTORY:  Prior breast surgeries: No  Prior radiation history: No  Menopause: Pre    Family History   Problem Relation Age of Onset     Prostate Cancer Paternal Grandfather      Cerebrovascular Disease Paternal Grandfather      Heart Disease Paternal Grandfather         MI      Hypertension Mother      Respiratory Sister         asthma     Thyroid Disease Mother      Lipids Father        Patient Active Problem List   Diagnosis     CARDIOVASCULAR SCREENING; LDL GOAL LESS THAN 160     HTN (hypertension)     Normal delivery     Chronic hypertension affecting pregnancy     Abscess        Past Medical History:   Diagnosis Date     Acne      Contraceptive management      Depressive disorder, not elsewhere classified 2003    TX'd with Prozac, resolved     Hyperhydrosis disorder     Left axilla & palm,  Robinul-Forte 2mg     Premenstrual syndrome (PMS)        Past Surgical History:   Procedure Laterality Date     DILATION AND CURETTAGE N/A 12/15/2014    Procedure: SUCTION CURETTAGE ;  Surgeon: Maulik Greenwood MD;  Location: [facility];  Service:      TONSILLECTOMY  1997     TONSILLECTOMY  1995     WISDOM TOOTH EXTRACTION  2005       Current Outpatient Medications   Medication Sig Dispense Refill     amoxicillin-clavulanate (AUGMENTIN) 875-125 MG tablet Take 1 tablet by mouth 2 times daily X 10 days. Started 8/5/24 PM       ferrous gluconate (FERGON) 324 (38 Fe) MG tablet Take 324 mg by mouth daily (with breakfast) (Patient not taking: Reported on 8/15/2024)       ibuprofen (ADVIL/MOTRIN) 200 MG tablet Take 4 tablets (800 mg) by mouth every 6 hours as needed for other (cramping)       labetalol (NORMODYNE) 100 MG tablet Take 100 mg by mouth 2 times daily       levothyroxine (SYNTHROID/LEVOTHROID) 50 MCG tablet Take 50 mcg by mouth daily       liothyronine (CYTOMEL) 5 MCG tablet Take 10 mcg by mouth daily       Prenatal Vit-Fe Fumarate-FA (PRENATAL MULTIVITAMIN W/IRON) 27-0.8 MG tablet Take 1 tablet by mouth daily          No Known Allergies     SOCIAL HISTORY:  Smokes: No  Here alone    ROS:  Back pain: No  Headache: No  Abdominal pain: No  Unexpected weight loss: No  Easy bruising/bleeding: No  History of DVT/PE: No    Breastfeeding Yes    Physical Exam   General: Awake,  alert, NAD  Head/neck: NCAT, no cervical lymphadenopathy  Breast: Breasts symmetric, bilateral nipples everted, right breast with erythema of inner upper quadrant, drain with serosanguinous output, no mass, left breast with no masses or skin changes, no axillary lymphadenopathy    ASSESSMENT:    Today we discussed typical treatment of breast abscess in the lactating woman.  Typically I would trial at least a few aspirations prior to operative treatment.  Things seem to be improving quite a bit.  With minimal output, I removed the drain in clinic today.  Will await final culture sensitivities.  Will plan to call us again next week with an update.  If continuing to improve will order imaging.  If worsening or not resolved will see her back sooner.    Edwin Langston MD         Again, thank you for allowing me to participate in the care of your patient.        Sincerely,        Edwin Langston MD

## 2024-08-16 LAB
BACTERIA ABSC ANAEROBE+AEROBE CULT: ABNORMAL
GRAM STAIN RESULT: ABNORMAL
GRAM STAIN RESULT: ABNORMAL

## 2024-08-19 ENCOUNTER — TELEPHONE (OUTPATIENT)
Dept: SURGERY | Facility: CLINIC | Age: 39
End: 2024-08-19
Payer: COMMERCIAL

## 2024-08-19 DIAGNOSIS — L02.91 ABSCESS: Primary | ICD-10-CM

## 2024-08-19 RX ORDER — SULFAMETHOXAZOLE AND TRIMETHOPRIM 400; 80 MG/1; MG/1
1 TABLET ORAL 2 TIMES DAILY
Qty: 20 TABLET | Refills: 0 | Status: SHIPPED | OUTPATIENT
Start: 2024-08-19

## 2024-08-19 NOTE — TELEPHONE ENCOUNTER
Called Chandni to check on her and see how she is doing with her breast abscess.  Patient said she feels she is doing a bit better, no more pain, no fever, but lump and redness on her skin still there. She has completed all her abx.      Updated Dr. Langston, he will send another round of the Bactrim to her [facility] pharmacy.  Told patient to check with the pharmacist about whether she can continue to breastfeed while on Bactrim. She agrees.    She asked about passing the MRSA onto her infant.  Support provided, told her it would be best to call her pediatrician for information on that.      Support provided, invited calls.

## 2024-08-20 LAB — BACTERIA ABSC ANAEROBE+AEROBE CULT: NORMAL

## 2024-08-21 ENCOUNTER — TRANSCRIBE ORDERS (OUTPATIENT)
Dept: OTHER | Age: 39
End: 2024-08-21

## 2024-08-21 DIAGNOSIS — A49.02 MRSA INFECTION: Primary | ICD-10-CM

## 2024-08-22 ENCOUNTER — OFFICE VISIT (OUTPATIENT)
Dept: SURGERY | Facility: CLINIC | Age: 39
End: 2024-08-22
Attending: SURGERY
Payer: COMMERCIAL

## 2024-08-22 DIAGNOSIS — N61.1 BREAST ABSCESS: Primary | ICD-10-CM

## 2024-08-22 PROCEDURE — 99211 OFF/OP EST MAY X REQ PHY/QHP: CPT | Performed by: SURGERY

## 2024-08-22 PROCEDURE — 99212 OFFICE O/P EST SF 10 MIN: CPT | Performed by: SURGERY

## 2024-08-22 NOTE — PROGRESS NOTES
BREAST FOLLOW-UP  Aug 22, 2024    Chandni Landin is a 39 year old female who returns for follow-up after drainage of right breast abscess.    HPI:    Since last visit she has been doing well.  Afebrile, no drainage, erythema much improved.  No issues with breastfeeding.    PE:    Upper inner right breast erythema significantly improved, no palpable abscess    ASSESSMENT:    Chandni Landin is a 39 year old female who returns for follow-up after drainage of right breast abscess.    PLAN:  She will call next week with update, if continuing to improve will plan for US in about 4 weeks, if anything of concern will plan for mammogram as well  Will call her with results and followup plan pending that imaging    Edwin Langston MD

## 2024-08-22 NOTE — LETTER
8/22/2024      Chandni Landin  [address]  [facility] [address]      Dear Colleague,    Thank you for referring your patient, Chandni Landin, to the [facility] [place]. Please see a copy of my visit note below.    BREAST FOLLOW-UP  Aug 22, 2024    Chandni Landin is a 39 year old female who returns for follow-up after drainage of right breast abscess.    HPI:    Since last visit she has been doing well.  Afebrile, no drainage, erythema much improved.  No issues with breastfeeding.    PE:    Upper inner right breast erythema significantly improved, no palpable abscess    ASSESSMENT:    Chandni Landin is a 39 year old female who returns for follow-up after drainage of right breast abscess.    PLAN:  She will call next week with update, if continuing to improve will plan for US in about 4 weeks, if anything of concern will plan for mammogram as well  Will call her with results and followup plan pending that imaging    Edwin Langston MD      Again, thank you for allowing me to participate in the care of your patient.        Sincerely,        Edwin Langston MD

## 2024-08-22 NOTE — NURSING NOTE
Chandni presents to [facility] for a follow up appointment with Dr. Langston. She continues to breastfeed. She is finally feeling much better. Redness and lump from abscess is much decreased. RN assessment and EMR update.  Patient met with Dr. Langston.  See dictation for details of visit. Patient will call on Monday with another update on her condition.  Support provided, invited calls.

## 2024-08-28 ENCOUNTER — OFFICE VISIT (OUTPATIENT)
Dept: INFECTIOUS DISEASES | Facility: CLINIC | Age: 39
End: 2024-08-28
Payer: COMMERCIAL

## 2024-08-28 VITALS
DIASTOLIC BLOOD PRESSURE: 79 MMHG | SYSTOLIC BLOOD PRESSURE: 113 MMHG | BODY MASS INDEX: 30.5 KG/M2 | HEART RATE: 86 BPM | TEMPERATURE: 97.9 F | WEIGHT: 183.3 LBS | OXYGEN SATURATION: 100 %

## 2024-08-28 DIAGNOSIS — A49.02 MRSA INFECTION: ICD-10-CM

## 2024-08-28 PROCEDURE — 99203 OFFICE O/P NEW LOW 30 MIN: CPT | Performed by: INTERNAL MEDICINE

## 2024-08-28 RX ORDER — MUPIROCIN 20 MG/G
OINTMENT TOPICAL
COMMUNITY
Start: 2024-08-20

## 2024-08-28 RX ORDER — CLINDAMYCIN PHOSPHATE 10 UG/ML
LOTION TOPICAL 2 TIMES DAILY
COMMUNITY
Start: 2024-08-21

## 2024-08-28 NOTE — PATIENT INSTRUCTIONS
After infection is resolving and skin lesion is healed, can attempt MRSA de-colonization with the followin. Dilute bleach baths: 1/4 to 1/2 cup bleach into 1/2 tub of water, soak for 15 minutes up to neck not to include head, for 7 days, then once to twice a week for 2-3 months.           Alternative to dilute bleach bath is Hibiclens body wash with bath or shower, same schedule, can wash hair/head as well.  2. Nasal mupirocin to both nares twice a day for 5 days.  3. Wash clothes, sheets, towels, surfaces at home at the start of de-colonization.    You can continue the topical clindamycin until skin wound is healed, then plan above decolonization process.     If you have another infection, seek care at urgent care for assessment, culture of pus if present and/or drainage of abscess, antibiotic (bactrim), and at that point would consider testing others in your home for colonization with nares swab for MRSA.     Please let me know if you have other concerns or questions.     Jaime Sykes MD

## 2024-08-28 NOTE — PROGRESS NOTES
[facility] INFECTIOUS DISEASE CLINIC INITIAL CONSULTATION    Date: 8/28/2024  Patient Name: Chandni Landin   YOB: 1985  MRN: [id number]      ASSESSMENT:  MRSA breast abscess, with previous axillary abscess with MRSA in nares in 2022. Testing negative for MRSA in nares this month at time of breast abscess.     With 2 documented previous MRSA infections, we discussed attempt at decolonization. Decolonization is most successful when skin lesions are healed.     PLAN:  Once breast abscess resolved and skin overlying is healed, begin decolonization protocol as outlined in AVS. Continue topical clindamycin until that time.   If she has recurrent infection despite this, would have other family members checked for MRSA colonization in nares.     Return to clinic as needed.    Jaime Sykes MD  [facility]   Clinic phone: [phone]   Clinic fax: [phone]    ______________________________________________________________________    HISTORY OF PRESENT ILLNESS: Chandni Landin is a 39 year old woman who is referred for evaluation of MRSA abscess    She had left neck abscess aspirated in November 2023, culture grew strep mitis/oralis.    FNA L lymph node 7/1/24    Started Augmentin for breast cellulitis. Was seen at [facility] ER found to have developed breast abscess, bactrim was added and culture from [facility] grew MRSA.    [facility] ER 8/12-13 -- 8cc of pus aspirated from abscess and drain was placed. Has seen Dr. Langston in general surgery.     Derm visit 8/21, recommended topical clindamycin wash.     Axilla abscess December 2022 -- nares + for MRSA, this was through her Primary physician at [place], through [facility] lab    She also thinks her daughter may have had MRSA -- had blister on knee at urgent care -- culture positive and antibiotic was changed -- July 2022.     Still taking bactrim 2 more days. Doing well.     Lives with 5 children and her .     Started topical  clindamycin lotion twice a day vs just before shower.     Has 15 mo, nursing.    3 bands positive for Lyme a few years -- had some symptoms, has deer in area, lives in [place]. Was treated for suspected Lyme.     She started nasal mupirocin -- started last week, BID.     Past Medical History:   Diagnosis Date    Acne     Contraceptive management     Depressive disorder, not elsewhere classified 2003    TX'd with Prozac, resolved    Hyperhydrosis disorder     Left axilla & palm,  Robinul-Forte 2mg    Premenstrual syndrome (PMS)    Thyroid  HTN    Past Surgical History:   Procedure Laterality Date    DILATION AND CURETTAGE N/A 12/15/2014    Procedure: SUCTION CURETTAGE ;  Surgeon: Maulik Greenwood MD;  Location: [facility] OR;  Service:     TONSILLECTOMY  1997    TONSILLECTOMY  1995    WISDOM TOOTH EXTRACTION  2005       Social History     Socioeconomic History    Marital status:      Spouse name: Not on file    Number of children: 0    Years of education: 16    Highest education level: Not on file   Occupational History    Occupation: Care Coord     Employer: [employer]   Tobacco Use    Smoking status: Never    Smokeless tobacco: Never   Substance and Sexual Activity    Alcohol use: No     Comment: 2 drinks per mth    Drug use: No    Sexual activity: Yes     Partners: Male   Other Topics Concern     Service No    Blood Transfusions No    Caffeine Concern No     Comment: 1 soda per day    Occupational Exposure No    Hobby Hazards No    Sleep Concern No    Stress Concern No    Weight Concern No    Special Diet Yes     Comment: milk    Back Care No    Exercise Yes     Comment: 3 times per week - swim or run    Bike Helmet No     Comment: N/A    Seat Belt Yes    Self-Exams No    Parent/sibling w/ CABG, MI or angioplasty before 65F 55M? Not Asked   Social History Narrative    Not on file     Social Determinants of Health     Financial Resource Strain: Not on file   Food Insecurity: Not  on file   Transportation Needs: Not on file   Physical Activity: Not on file   Stress: Not on file   Social Connections: Not on file   Interpersonal Safety: Not on file   Housing Stability: Not on file       Immunization History   Administered Date(s) Administered    COVID-19 Monovalent 18+ (Moderna) 08/04/2021    HIB (PRP-T) 08/01/1988    HepB 09/12/1997, 10/17/1997, 03/20/1998    Historical DTP/aP 1985, 1985, 1985, 10/27/1986, 05/22/1990    MMR 07/28/1986, 09/12/1997    OPV, trivalent, live 1985, 1985, 1985, 10/27/1986, 05/22/1990    TD,PF 7+ (Tenivac) 10/17/1997    TDAP Vaccine (Adacel) 07/29/2009      No tobacco, occasional EtOH, no pets    15 mo to 11 years old    ROS: All other systems negative except as listed above.      Current Outpatient Medications:     clindamycin (CLEOCIN T) 1 % external lotion, Apply topically 2 times daily., Disp: , Rfl:     labetalol (NORMODYNE) 100 MG tablet, Take 100 mg by mouth 2 times daily, Disp: , Rfl:     levothyroxine (SYNTHROID/LEVOTHROID) 50 MCG tablet, Take 50 mcg by mouth daily, Disp: , Rfl:     liothyronine (CYTOMEL) 5 MCG tablet, Take 10 mcg by mouth daily, Disp: , Rfl:     mupirocin (BACTROBAN) 2 % external ointment, APPLY IN NOSE AT BEDTIME FOR 30 DAYS THEN USE FOR 7 DAYS PER MONTH FOR 3 MONTHS*, Disp: , Rfl:     Prenatal Vit-Fe Fumarate-FA (PRENATAL MULTIVITAMIN W/IRON) 27-0.8 MG tablet, Take 1 tablet by mouth daily, Disp: , Rfl:     sulfamethoxazole-trimethoprim (BACTRIM) 400-80 MG tablet, Take 1 tablet by mouth 2 times daily, Disp: 20 tablet, Rfl: 0      OBJECTIVE:  /79   Pulse 86   Temp 97.9  F (36.6  C) (Oral)   Wt 83.1 kg (183 lb 4.8 oz)   SpO2 100%   BMI 30.50 kg/m        GEN: No acute distress.  HEENT: conjunctiva clear  RESPIRATORY:  Normal breathing pattern.   CARDIOVASCULAR:  deferred  ABDOMEN:  deferred  EXTREMITIES: No edema.  SKIN/HAIR/NAILS:  did not do skin exam  NEURO: appears intact          Pertinent  labs:    Recent reviewed      MICROBIOLOGY DATA:  8/8/24 abscess  Methicillin Resistant Staphylococcus aureus (MRSA) Clindamycin <=0.5 mcg/mL: Susceptible    Comment: No evidence of inducible clindamycin resistance.   Methicillin Resistant Staphylococcus aureus (MRSA) Daptomycin <=0.5 mcg/mL: Susceptible   Methicillin Resistant Staphylococcus aureus (MRSA) Erythromycin >4 mcg/mL: Resistant   Methicillin Resistant Staphylococcus aureus (MRSA) Oxacillin >4 mcg/mL: Resistant   Methicillin Resistant Staphylococcus aureus (MRSA) Trimethoprim/Sulfamethoxazole <=0.5 mcg/mL: Susceptible   Methicillin Resistant Staphylococcus aureus (MRSA) Tetracycline <=0.5 mcg/mL: Susceptible   Methicillin Resistant Staphylococcus aureus (MRSA) Vancomycin 1 mcg/mL: Susceptible

## 2024-11-16 ENCOUNTER — HEALTH MAINTENANCE LETTER (OUTPATIENT)
Age: 39
End: 2024-11-16

## 2024-12-02 ENCOUNTER — ANCILLARY PROCEDURE (OUTPATIENT)
Dept: MAMMOGRAPHY | Facility: CLINIC | Age: 39
End: 2024-12-02
Attending: SURGERY
Payer: COMMERCIAL

## 2024-12-02 DIAGNOSIS — N61.1 BREAST ABSCESS: ICD-10-CM

## 2024-12-02 PROCEDURE — 76642 ULTRASOUND BREAST LIMITED: CPT | Mod: RT

## 2025-04-20 ENCOUNTER — HEALTH MAINTENANCE LETTER (OUTPATIENT)
Age: 40
End: 2025-04-20

## 2025-07-08 ENCOUNTER — PATIENT OUTREACH (OUTPATIENT)
Dept: CARE COORDINATION | Facility: CLINIC | Age: 40
End: 2025-07-08
Payer: COMMERCIAL

## 2025-07-31 ENCOUNTER — ANCILLARY PROCEDURE (OUTPATIENT)
Dept: MAMMOGRAPHY | Facility: CLINIC | Age: 40
End: 2025-07-31
Attending: FAMILY MEDICINE
Payer: COMMERCIAL

## 2025-07-31 ENCOUNTER — TELEPHONE (OUTPATIENT)
Dept: MAMMOGRAPHY | Facility: CLINIC | Age: 40
End: 2025-07-31

## 2025-07-31 DIAGNOSIS — Z12.31 VISIT FOR SCREENING MAMMOGRAM: ICD-10-CM

## 2025-07-31 NOTE — TELEPHONE ENCOUNTER
I returned patients call re: questions about her diagnostic mammogram and ultrasound.  She is wondering if this is the same as the procedure that she had in December for her breast abscess.  I explained the ultrasound is the same technique but the reason for the exam is different.  I explained that the radiologist saw asymmetry in the right breast and requires further imaging to complete the breast evaluation. The patient denied further questions at this time.  She will call back if any further questions arise.    Sandi Parks RN  RN Imaging Care Coordinator  [facility]

## 2025-08-04 ENCOUNTER — ANCILLARY PROCEDURE (OUTPATIENT)
Dept: MAMMOGRAPHY | Facility: CLINIC | Age: 40
End: 2025-08-04
Attending: FAMILY MEDICINE
Payer: COMMERCIAL

## 2025-08-04 DIAGNOSIS — R92.8 ABNORMAL MAMMOGRAM: ICD-10-CM

## 2025-08-04 PROCEDURE — 77065 DX MAMMO INCL CAD UNI: CPT | Mod: RT

## (undated) RX ORDER — LIDOCAINE HYDROCHLORIDE 10 MG/ML
INJECTION, SOLUTION INFILTRATION; PERINEURAL
Status: DISPENSED
Start: 2024-08-13